# Patient Record
Sex: FEMALE | Race: WHITE | NOT HISPANIC OR LATINO | URBAN - METROPOLITAN AREA
[De-identification: names, ages, dates, MRNs, and addresses within clinical notes are randomized per-mention and may not be internally consistent; named-entity substitution may affect disease eponyms.]

---

## 2018-06-21 ENCOUNTER — APPOINTMENT (RX ONLY)
Dept: URBAN - METROPOLITAN AREA OTHER 9 | Facility: OTHER | Age: 68
Setting detail: DERMATOLOGY
End: 2018-06-21

## 2018-06-21 DIAGNOSIS — D22 MELANOCYTIC NEVI: ICD-10-CM

## 2018-06-21 DIAGNOSIS — L82.1 OTHER SEBORRHEIC KERATOSIS: ICD-10-CM

## 2018-06-21 DIAGNOSIS — L91.8 OTHER HYPERTROPHIC DISORDERS OF THE SKIN: ICD-10-CM

## 2018-06-21 DIAGNOSIS — L81.4 OTHER MELANIN HYPERPIGMENTATION: ICD-10-CM

## 2018-06-21 PROBLEM — D22.5 MELANOCYTIC NEVI OF TRUNK: Status: ACTIVE | Noted: 2018-06-21

## 2018-06-21 PROCEDURE — 99214 OFFICE O/P EST MOD 30 MIN: CPT

## 2018-06-21 PROCEDURE — ? COUNSELING

## 2018-06-21 ASSESSMENT — LOCATION SIMPLE DESCRIPTION DERM
LOCATION SIMPLE: RIGHT FOREHEAD
LOCATION SIMPLE: RIGHT THIGH
LOCATION SIMPLE: LEFT FOREHEAD
LOCATION SIMPLE: UPPER BACK
LOCATION SIMPLE: ABDOMEN
LOCATION SIMPLE: LEFT POSTERIOR UPPER ARM

## 2018-06-21 ASSESSMENT — LOCATION ZONE DERM
LOCATION ZONE: TRUNK
LOCATION ZONE: ARM
LOCATION ZONE: FACE
LOCATION ZONE: LEG

## 2018-06-21 ASSESSMENT — LOCATION DETAILED DESCRIPTION DERM
LOCATION DETAILED: LEFT PROXIMAL POSTERIOR UPPER ARM
LOCATION DETAILED: RIGHT FOREHEAD
LOCATION DETAILED: PERIUMBILICAL SKIN
LOCATION DETAILED: RIGHT ANTERIOR DISTAL THIGH
LOCATION DETAILED: INFERIOR THORACIC SPINE
LOCATION DETAILED: LEFT FOREHEAD

## 2019-05-01 PROBLEM — Z12.11 ENCOUNTER FOR SCREENING FOR MALIGNANT NEOPLASM OF COLON: Status: ACTIVE | Noted: 2019-05-01

## 2019-05-01 PROBLEM — K21.9 GASTRO-ESOPHAGEAL REFLUX DISEASE WITHOUT ESOPHAGITIS: Status: ACTIVE | Noted: 2019-05-01

## 2020-01-09 ENCOUNTER — APPOINTMENT (RX ONLY)
Dept: URBAN - METROPOLITAN AREA CLINIC 349 | Facility: CLINIC | Age: 70
Setting detail: DERMATOLOGY
End: 2020-01-09

## 2020-01-09 DIAGNOSIS — B00.1 HERPESVIRAL VESICULAR DERMATITIS: ICD-10-CM

## 2020-01-09 DIAGNOSIS — M34.83 SYSTEMIC SCLEROSIS WITH POLYNEUROPATHY: ICD-10-CM

## 2020-01-09 DIAGNOSIS — D22 MELANOCYTIC NEVI: ICD-10-CM

## 2020-01-09 DIAGNOSIS — L82.1 OTHER SEBORRHEIC KERATOSIS: ICD-10-CM

## 2020-01-09 DIAGNOSIS — Z71.89 OTHER SPECIFIED COUNSELING: ICD-10-CM

## 2020-01-09 PROBLEM — D22.5 MELANOCYTIC NEVI OF TRUNK: Status: ACTIVE | Noted: 2020-01-09

## 2020-01-09 PROCEDURE — ? OTHER

## 2020-01-09 PROCEDURE — ? BODY PHOTOGRAPHY

## 2020-01-09 PROCEDURE — ? REFERRAL

## 2020-01-09 PROCEDURE — 99203 OFFICE O/P NEW LOW 30 MIN: CPT

## 2020-01-09 PROCEDURE — ? COUNSELING

## 2020-01-09 PROCEDURE — ? EDUCATIONAL RESOURCES PROVIDED

## 2020-01-09 ASSESSMENT — LOCATION SIMPLE DESCRIPTION DERM
LOCATION SIMPLE: RIGHT FOREARM
LOCATION SIMPLE: UPPER BACK
LOCATION SIMPLE: RIGHT LIP
LOCATION SIMPLE: LEFT UPPER BACK
LOCATION SIMPLE: ABDOMEN
LOCATION SIMPLE: RIGHT UPPER BACK
LOCATION SIMPLE: LEFT SHOULDER
LOCATION SIMPLE: RIGHT BACK

## 2020-01-09 ASSESSMENT — LOCATION DETAILED DESCRIPTION DERM
LOCATION DETAILED: RIGHT PROXIMAL DORSAL FOREARM
LOCATION DETAILED: LEFT RIB CAGE
LOCATION DETAILED: RIGHT UPPER CUTANEOUS LIP
LOCATION DETAILED: LEFT INFERIOR MEDIAL UPPER BACK
LOCATION DETAILED: RIGHT SUPERIOR LATERAL UPPER BACK
LOCATION DETAILED: INFERIOR THORACIC SPINE
LOCATION DETAILED: LEFT ANTERIOR SHOULDER
LOCATION DETAILED: LEFT MEDIAL UPPER BACK
LOCATION DETAILED: RIGHT MEDIAL UPPER BACK

## 2020-01-09 ASSESSMENT — LOCATION ZONE DERM
LOCATION ZONE: TRUNK
LOCATION ZONE: ARM
LOCATION ZONE: LIP

## 2020-01-09 NOTE — PROCEDURE: BODY PHOTOGRAPHY
Whole Body Statement: The whole body was photographed today.
Detail Level: Generalized
Reason For Photography: The patient is obtaining body photography to observe existing suspicious moles and or monitor for the appearance of any new lesions.
Number Of Photographs (Optional- Will Not Render If 0): 2
Was The Entire Body Photographed (Cannot Bill Unless Entire Body Photographed)?: No
Consent: Written consent obtained, risks reviewed for whole body photography. Patient understands that photograph costs may not be covered by insurance, and patient is ultimately responsible for payment.

## 2020-01-09 NOTE — PROCEDURE: OTHER
Detail Level: Detailed
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 She had experimental treatment at the time.\\nRemnants of this remain on the patients skin today.
Note Text (......Xxx Chief Complaint.): This diagnosis correlates with the
Other (Free Text): Patient is using otc carmex. Advised patient otc treatment is not typically very effective. Patient states this has been present for 3 days. Offered prescription, patient declined

## 2020-10-22 ENCOUNTER — APPOINTMENT (RX ONLY)
Dept: URBAN - METROPOLITAN AREA CLINIC 349 | Facility: CLINIC | Age: 70
Setting detail: DERMATOLOGY
End: 2020-10-22

## 2020-10-22 DIAGNOSIS — D22 MELANOCYTIC NEVI: ICD-10-CM | Status: STABLE

## 2020-10-22 DIAGNOSIS — L82.0 INFLAMED SEBORRHEIC KERATOSIS: ICD-10-CM

## 2020-10-22 PROBLEM — D22.4 MELANOCYTIC NEVI OF SCALP AND NECK: Status: ACTIVE | Noted: 2020-10-22

## 2020-10-22 PROBLEM — D22.5 MELANOCYTIC NEVI OF TRUNK: Status: ACTIVE | Noted: 2020-10-22

## 2020-10-22 PROCEDURE — ? COUNSELING

## 2020-10-22 PROCEDURE — ? BODY PHOTOGRAPHY

## 2020-10-22 PROCEDURE — ? MEDICAL PHOTOGRAPHY REVIEW

## 2020-10-22 PROCEDURE — ? PHOTO-DOCUMENTATION

## 2020-10-22 PROCEDURE — ? LIQUID NITROGEN

## 2020-10-22 PROCEDURE — 17110 DESTRUCTION B9 LES UP TO 14: CPT

## 2020-10-22 PROCEDURE — 99214 OFFICE O/P EST MOD 30 MIN: CPT | Mod: 25

## 2020-10-22 PROCEDURE — ? OBSERVATION

## 2020-10-22 ASSESSMENT — LOCATION SIMPLE DESCRIPTION DERM
LOCATION SIMPLE: RIGHT UPPER BACK
LOCATION SIMPLE: LEFT UPPER BACK
LOCATION SIMPLE: NECK
LOCATION SIMPLE: RIGHT FOREHEAD

## 2020-10-22 ASSESSMENT — LOCATION DETAILED DESCRIPTION DERM
LOCATION DETAILED: LEFT INFERIOR MEDIAL UPPER BACK
LOCATION DETAILED: LEFT MEDIAL UPPER BACK
LOCATION DETAILED: RIGHT INFERIOR FOREHEAD
LOCATION DETAILED: RIGHT CENTRAL LATERAL NECK
LOCATION DETAILED: RIGHT MEDIAL UPPER BACK

## 2020-10-22 ASSESSMENT — LOCATION ZONE DERM
LOCATION ZONE: FACE
LOCATION ZONE: TRUNK
LOCATION ZONE: NECK

## 2020-10-22 NOTE — PROCEDURE: LIQUID NITROGEN
Add 52 Modifier (Optional): no
Include Z78.9 (Other Specified Conditions Influencing Health Status) As An Associated Diagnosis?: Yes
Consent: The patient's consent was obtained including but not limited to risks of crusting, scabbing, blistering, scarring, darker or lighter pigmentary change, recurrence, incomplete removal and infection.
Medical Necessity Clause: This procedure was medically necessary because the lesions that were treated were:
Number Of Freeze-Thaw Cycles: 2 freeze-thaw cycles
Post-Care Instructions: I reviewed with the patient in detail post-care instructions. Patient is to wear sunprotection, and avoid picking at any of the treated lesions. Pt may apply Vaseline to crusted or scabbing areas.
Medical Necessity Information: It is in your best interest to select a reason for this procedure from the list below. All of these items fulfill various CMS LCD requirements except the new and changing color options.
Duration Of Freeze Thaw-Cycle (Seconds): 5-10
Detail Level: Detailed

## 2020-10-22 NOTE — PROCEDURE: BODY PHOTOGRAPHY
Detail Level: Generalized
Number Of Photographs (Optional- Will Not Render If 0): 1
Was The Entire Body Photographed (Cannot Bill Unless Entire Body Photographed)?: No
Consent: Written consent obtained, risks reviewed for whole body photography. Patient understands that photograph costs may not be covered by insurance, and patient is ultimately responsible for payment.
Whole Body Statement: The whole body was photographed today.
Reason For Photography: The patient is obtaining body photography to observe existing suspicious moles and or monitor for the appearance of any new lesions.

## 2021-05-13 ENCOUNTER — APPOINTMENT (RX ONLY)
Dept: URBAN - METROPOLITAN AREA CLINIC 349 | Facility: CLINIC | Age: 71
Setting detail: DERMATOLOGY
End: 2021-05-13

## 2021-05-13 DIAGNOSIS — D22 MELANOCYTIC NEVI: ICD-10-CM | Status: STABLE

## 2021-05-13 DIAGNOSIS — L57.8 OTHER SKIN CHANGES DUE TO CHRONIC EXPOSURE TO NONIONIZING RADIATION: ICD-10-CM

## 2021-05-13 DIAGNOSIS — Z71.89 OTHER SPECIFIED COUNSELING: ICD-10-CM

## 2021-05-13 DIAGNOSIS — L82.1 OTHER SEBORRHEIC KERATOSIS: ICD-10-CM

## 2021-05-13 PROBLEM — D22.4 MELANOCYTIC NEVI OF SCALP AND NECK: Status: ACTIVE | Noted: 2021-05-13

## 2021-05-13 PROBLEM — D22.5 MELANOCYTIC NEVI OF TRUNK: Status: ACTIVE | Noted: 2021-05-13

## 2021-05-13 PROCEDURE — ? OTHER

## 2021-05-13 PROCEDURE — ? COUNSELING

## 2021-05-13 PROCEDURE — ? SUNSCREEN RECOMMENDATIONS

## 2021-05-13 PROCEDURE — ? MEDICAL PHOTOGRAPHY REVIEW

## 2021-05-13 PROCEDURE — ? EDUCATIONAL RESOURCES PROVIDED

## 2021-05-13 PROCEDURE — 99213 OFFICE O/P EST LOW 20 MIN: CPT

## 2021-05-13 ASSESSMENT — LOCATION SIMPLE DESCRIPTION DERM
LOCATION SIMPLE: RIGHT UPPER BACK
LOCATION SIMPLE: LEFT UPPER BACK
LOCATION SIMPLE: LEFT SHOULDER
LOCATION SIMPLE: NECK

## 2021-05-13 ASSESSMENT — LOCATION ZONE DERM
LOCATION ZONE: NECK
LOCATION ZONE: ARM
LOCATION ZONE: TRUNK

## 2021-05-13 ASSESSMENT — LOCATION DETAILED DESCRIPTION DERM
LOCATION DETAILED: RIGHT INFERIOR UPPER BACK
LOCATION DETAILED: RIGHT MEDIAL UPPER BACK
LOCATION DETAILED: RIGHT CENTRAL LATERAL NECK
LOCATION DETAILED: LEFT MEDIAL UPPER BACK
LOCATION DETAILED: LEFT ANTERIOR SHOULDER

## 2021-05-13 NOTE — PROCEDURE: OTHER
Other (Free Text): offered LN2, patient declined. \\nPatient is not bothered by these
Render Risk Assessment In Note?: yes
Detail Level: Detailed
Note Text (......Xxx Chief Complaint.): This diagnosis correlates with the

## 2021-05-13 NOTE — PROCEDURE: REASSURANCE
Include Location In Plan?: Yes
Hide Include Location In Plan Question?: No
Detail Level: Detailed
Additional Note: Compared to photo this lesion has remained stable

## 2021-07-21 PROCEDURE — 99285 EMERGENCY DEPT VISIT HI MDM: CPT

## 2021-07-21 PROCEDURE — 96375 TX/PRO/DX INJ NEW DRUG ADDON: CPT

## 2021-07-21 PROCEDURE — 96361 HYDRATE IV INFUSION ADD-ON: CPT

## 2021-07-21 PROCEDURE — 96365 THER/PROPH/DIAG IV INF INIT: CPT

## 2021-07-21 PROCEDURE — 81003 URINALYSIS AUTO W/O SCOPE: CPT

## 2021-07-21 PROCEDURE — 96367 TX/PROPH/DG ADDL SEQ IV INF: CPT

## 2021-07-21 PROCEDURE — 96366 THER/PROPH/DIAG IV INF ADDON: CPT

## 2021-07-21 RX ORDER — SODIUM CHLORIDE 0.9 % (FLUSH) 0.9 %
5-10 SYRINGE (ML) INJECTION EVERY 8 HOURS
Status: DISCONTINUED | OUTPATIENT
Start: 2021-07-21 | End: 2021-07-22 | Stop reason: HOSPADM

## 2021-07-21 RX ORDER — SODIUM CHLORIDE 0.9 % (FLUSH) 0.9 %
5-10 SYRINGE (ML) INJECTION AS NEEDED
Status: DISCONTINUED | OUTPATIENT
Start: 2021-07-21 | End: 2021-07-22 | Stop reason: HOSPADM

## 2021-07-22 ENCOUNTER — HOSPITAL ENCOUNTER (EMERGENCY)
Age: 71
Discharge: ANOTHER HOSPITAL | End: 2021-07-22
Payer: MEDICARE

## 2021-07-22 ENCOUNTER — HOSPITAL ENCOUNTER (OUTPATIENT)
Age: 71
Setting detail: OBSERVATION
LOS: 1 days | Discharge: HOME | End: 2021-07-23
Attending: INTERNAL MEDICINE | Admitting: FAMILY MEDICINE
Payer: MEDICARE

## 2021-07-22 ENCOUNTER — APPOINTMENT (OUTPATIENT)
Dept: NON INVASIVE DIAGNOSTICS | Age: 71
End: 2021-07-22
Attending: FAMILY MEDICINE
Payer: MEDICARE

## 2021-07-22 VITALS
WEIGHT: 150 LBS | DIASTOLIC BLOOD PRESSURE: 71 MMHG | RESPIRATION RATE: 13 BRPM | HEART RATE: 76 BPM | OXYGEN SATURATION: 97 % | SYSTOLIC BLOOD PRESSURE: 129 MMHG | TEMPERATURE: 98.5 F

## 2021-07-22 DIAGNOSIS — I48.91 ATRIAL FIBRILLATION WITH RVR (HCC): ICD-10-CM

## 2021-07-22 DIAGNOSIS — E78.2 MIXED HYPERLIPIDEMIA: ICD-10-CM

## 2021-07-22 DIAGNOSIS — I48.91 NEW ONSET ATRIAL FIBRILLATION (HCC): Primary | ICD-10-CM

## 2021-07-22 DIAGNOSIS — I10 ESSENTIAL HYPERTENSION: ICD-10-CM

## 2021-07-22 PROBLEM — R11.2 NAUSEA & VOMITING: Status: ACTIVE | Noted: 2021-07-22

## 2021-07-22 PROBLEM — E11.9 DM TYPE 2 (DIABETES MELLITUS, TYPE 2) (HCC): Status: ACTIVE | Noted: 2021-07-22

## 2021-07-22 PROBLEM — E78.5 HYPERLIPIDEMIA: Status: ACTIVE | Noted: 2021-07-22

## 2021-07-22 PROBLEM — K21.9 GERD (GASTROESOPHAGEAL REFLUX DISEASE): Status: ACTIVE | Noted: 2021-07-22

## 2021-07-22 PROBLEM — N39.0 UTI (URINARY TRACT INFECTION): Status: ACTIVE | Noted: 2021-07-22

## 2021-07-22 LAB
ALBUMIN SERPL-MCNC: 3.7 G/DL (ref 3.2–4.6)
ALBUMIN/GLOB SERPL: 0.9 {RATIO} (ref 1.2–3.5)
ALP SERPL-CCNC: 95 U/L (ref 50–136)
ALT SERPL-CCNC: 26 U/L (ref 12–65)
ANION GAP SERPL CALC-SCNC: 8 MMOL/L (ref 7–16)
AST SERPL-CCNC: 18 U/L (ref 15–37)
ATRIAL RATE: 141 BPM
BACTERIA URNS QL MICRO: 0 /HPF
BASOPHILS # BLD: 0.1 K/UL (ref 0–0.2)
BASOPHILS NFR BLD: 0 % (ref 0–2)
BILIRUB SERPL-MCNC: 0.4 MG/DL (ref 0.2–1.1)
BUN SERPL-MCNC: 15 MG/DL (ref 8–23)
CALCIUM SERPL-MCNC: 9.3 MG/DL (ref 8.3–10.4)
CALCULATED R AXIS, ECG10: 16 DEGREES
CALCULATED T AXIS, ECG11: 16 DEGREES
CASTS URNS QL MICRO: 0 /LPF
CHLORIDE SERPL-SCNC: 107 MMOL/L (ref 98–107)
CO2 SERPL-SCNC: 25 MMOL/L (ref 21–32)
CREAT SERPL-MCNC: 0.77 MG/DL (ref 0.6–1)
DIAGNOSIS, 93000: NORMAL
DIFFERENTIAL METHOD BLD: ABNORMAL
ECHO AO ASC DIAM: 3.6 CM
ECHO AO ROOT DIAM: 2.9 CM
ECHO AV AREA PEAK VELOCITY: 1.82 CM2
ECHO AV AREA VTI: 2 CM2
ECHO AV AREA/BSA PEAK VELOCITY: 1.1 CM2/M2
ECHO AV AREA/BSA VTI: 1.2 CM2/M2
ECHO AV MEAN GRADIENT: 4 MMHG
ECHO AV PEAK GRADIENT: 7.7 MMHG
ECHO AV PEAK VELOCITY: 139 CM/S
ECHO AV VTI: 32.2 CM
ECHO LA AREA 2C: 17.9 CM2
ECHO LA AREA 4C: 17.9 CM2
ECHO LA DIAMETER: 3.4 CM
ECHO LA MAJOR AXIS: 5.47 CM
ECHO LA MINOR AXIS: 3.16 CM
ECHO LA TO AORTIC ROOT RATIO: 1.17
ECHO LA VOL BP: 51.5 ML (ref 22–52)
ECHO LA VOL/BSA BIPLANE: 29.77 ML/M2
ECHO LV E' LATERAL VELOCITY: 6.96 CM/S
ECHO LV E' SEPTAL VELOCITY: 7.35 CM/S
ECHO LV E' SEPTAL VELOCITY: 7.35 CM/S
ECHO LV INTERNAL DIMENSION DIASTOLIC: 3.8 CM (ref 3.9–5.3)
ECHO LV INTERNAL DIMENSION SYSTOLIC: 2.4 CM
ECHO LV IVSD: 1 CM (ref 0.6–0.9)
ECHO LV MASS 2D: 117.3 G (ref 67–162)
ECHO LV MASS INDEX 2D: 67.8 G/M2 (ref 43–95)
ECHO LV POSTERIOR WALL DIASTOLIC: 1 CM (ref 0.6–0.9)
ECHO LVOT DIAM: 1.8 CM
ECHO LVOT PEAK GRADIENT: 4 MMHG
ECHO LVOT PEAK VELOCITY: 99.8 CM/S
ECHO LVOT VTI: 24.8 CM
ECHO MV A VELOCITY: 136 CM/S
ECHO MV AREA PHT: 3 CM2
ECHO MV E VELOCITY: 93 CM/S
ECHO MV E/A RATIO: 0.68
ECHO MV E/E' LATERAL: 13.36
ECHO MV MEAN GRADIENT: 3.5 MMHG
ECHO MV PEAK GRADIENT: 3.5 MMHG
ECHO MV PRESSURE HALF TIME (PHT): 74 MS
ECHO RV INTERNAL DIMENSION: 3.3 CM
ECHO RV TAPSE: 1.76 CM (ref 1.5–2)
EOSINOPHIL # BLD: 0.1 K/UL (ref 0–0.8)
EOSINOPHIL NFR BLD: 0 % (ref 0.5–7.8)
EPI CELLS #/AREA URNS HPF: NORMAL /HPF
ERYTHROCYTE [DISTWIDTH] IN BLOOD BY AUTOMATED COUNT: 12.2 % (ref 11.9–14.6)
EST. AVERAGE GLUCOSE BLD GHB EST-MCNC: 148 MG/DL
GLOBULIN SER CALC-MCNC: 4 G/DL (ref 2.3–3.5)
GLUCOSE BLD STRIP.AUTO-MCNC: 109 MG/DL (ref 65–100)
GLUCOSE BLD STRIP.AUTO-MCNC: 155 MG/DL (ref 65–100)
GLUCOSE BLD STRIP.AUTO-MCNC: 226 MG/DL (ref 65–100)
GLUCOSE SERPL-MCNC: 165 MG/DL (ref 65–100)
HBA1C MFR BLD: 6.8 % (ref 4.2–6.3)
HCT VFR BLD AUTO: 41.5 % (ref 35.8–46.3)
HGB BLD-MCNC: 14.3 G/DL (ref 11.7–15.4)
IMM GRANULOCYTES # BLD AUTO: 0 K/UL (ref 0–0.5)
IMM GRANULOCYTES NFR BLD AUTO: 0 % (ref 0–5)
LIPASE SERPL-CCNC: 168 U/L (ref 73–393)
LYMPHOCYTES # BLD: 2.7 K/UL (ref 0.5–4.6)
LYMPHOCYTES NFR BLD: 23 % (ref 13–44)
MAGNESIUM SERPL-MCNC: 1.9 MG/DL (ref 1.8–2.4)
MCH RBC QN AUTO: 31 PG (ref 26.1–32.9)
MCHC RBC AUTO-ENTMCNC: 34.5 G/DL (ref 31.4–35)
MCV RBC AUTO: 89.8 FL (ref 79.6–97.8)
MONOCYTES # BLD: 0.7 K/UL (ref 0.1–1.3)
MONOCYTES NFR BLD: 6 % (ref 4–12)
NEUTS SEG # BLD: 7.8 K/UL (ref 1.7–8.2)
NEUTS SEG NFR BLD: 69 % (ref 43–78)
NRBC # BLD: 0 K/UL (ref 0–0.2)
PLATELET # BLD AUTO: 312 K/UL (ref 150–450)
PMV BLD AUTO: 10.8 FL (ref 9.4–12.3)
POTASSIUM SERPL-SCNC: 3.7 MMOL/L (ref 3.5–5.1)
PROT SERPL-MCNC: 7.7 G/DL (ref 6.3–8.2)
Q-T INTERVAL, ECG07: 322 MS
QRS DURATION, ECG06: 72 MS
QTC CALCULATION (BEZET), ECG08: 473 MS
RBC # BLD AUTO: 4.62 M/UL (ref 4.05–5.2)
RBC #/AREA URNS HPF: NORMAL /HPF
SERVICE CMNT-IMP: ABNORMAL
SODIUM SERPL-SCNC: 140 MMOL/L (ref 136–145)
TROPONIN-HIGH SENSITIVITY: 31.6 PG/ML (ref 0–14)
TROPONIN-HIGH SENSITIVITY: 52.6 PG/ML (ref 0–14)
TSH SERPL DL<=0.005 MIU/L-ACNC: 1.93 UIU/ML (ref 0.36–3.74)
VENTRICULAR RATE, ECG03: 130 BPM
WBC # BLD AUTO: 11.3 K/UL (ref 4.3–11.1)
WBC URNS QL MICRO: NORMAL /HPF

## 2021-07-22 PROCEDURE — 96367 TX/PROPH/DG ADDL SEQ IV INF: CPT

## 2021-07-22 PROCEDURE — 96366 THER/PROPH/DIAG IV INF ADDON: CPT

## 2021-07-22 PROCEDURE — 96375 TX/PRO/DX INJ NEW DRUG ADDON: CPT

## 2021-07-22 PROCEDURE — 74011250637 HC RX REV CODE- 250/637: Performed by: FAMILY MEDICINE

## 2021-07-22 PROCEDURE — 82962 GLUCOSE BLOOD TEST: CPT

## 2021-07-22 PROCEDURE — 74011250636 HC RX REV CODE- 250/636

## 2021-07-22 PROCEDURE — 83690 ASSAY OF LIPASE: CPT

## 2021-07-22 PROCEDURE — 84443 ASSAY THYROID STIM HORMONE: CPT

## 2021-07-22 PROCEDURE — 85025 COMPLETE CBC W/AUTO DIFF WBC: CPT

## 2021-07-22 PROCEDURE — 87086 URINE CULTURE/COLONY COUNT: CPT

## 2021-07-22 PROCEDURE — 83735 ASSAY OF MAGNESIUM: CPT

## 2021-07-22 PROCEDURE — 87040 BLOOD CULTURE FOR BACTERIA: CPT

## 2021-07-22 PROCEDURE — 81015 MICROSCOPIC EXAM OF URINE: CPT

## 2021-07-22 PROCEDURE — 36415 COLL VENOUS BLD VENIPUNCTURE: CPT

## 2021-07-22 PROCEDURE — 80053 COMPREHEN METABOLIC PANEL: CPT

## 2021-07-22 PROCEDURE — C8929 TTE W OR WO FOL WCON,DOPPLER: HCPCS

## 2021-07-22 PROCEDURE — 74011000258 HC RX REV CODE- 258

## 2021-07-22 PROCEDURE — 93005 ELECTROCARDIOGRAM TRACING: CPT

## 2021-07-22 PROCEDURE — 74011000250 HC RX REV CODE- 250: Performed by: FAMILY MEDICINE

## 2021-07-22 PROCEDURE — 84484 ASSAY OF TROPONIN QUANT: CPT

## 2021-07-22 PROCEDURE — 99218 HC RM OBSERVATION: CPT

## 2021-07-22 PROCEDURE — 96372 THER/PROPH/DIAG INJ SC/IM: CPT

## 2021-07-22 PROCEDURE — 74011000250 HC RX REV CODE- 250

## 2021-07-22 PROCEDURE — 74011250636 HC RX REV CODE- 250/636: Performed by: FAMILY MEDICINE

## 2021-07-22 PROCEDURE — 83036 HEMOGLOBIN GLYCOSYLATED A1C: CPT

## 2021-07-22 PROCEDURE — 96365 THER/PROPH/DIAG IV INF INIT: CPT

## 2021-07-22 PROCEDURE — 74011636637 HC RX REV CODE- 636/637: Performed by: FAMILY MEDICINE

## 2021-07-22 PROCEDURE — 99220 PR INITIAL OBSERVATION CARE/DAY 70 MINUTES: CPT | Performed by: INTERNAL MEDICINE

## 2021-07-22 RX ORDER — ACETAMINOPHEN 325 MG/1
650 TABLET ORAL
Status: DISCONTINUED | OUTPATIENT
Start: 2021-07-22 | End: 2021-07-23 | Stop reason: HOSPADM

## 2021-07-22 RX ORDER — GLIMEPIRIDE 4 MG/1
4 TABLET ORAL
COMMUNITY

## 2021-07-22 RX ORDER — ONDANSETRON 2 MG/ML
4 INJECTION INTRAMUSCULAR; INTRAVENOUS
Status: DISCONTINUED | OUTPATIENT
Start: 2021-07-22 | End: 2021-07-23 | Stop reason: HOSPADM

## 2021-07-22 RX ORDER — GUAIFENESIN 100 MG/5ML
81 LIQUID (ML) ORAL DAILY
COMMUNITY
End: 2021-07-23

## 2021-07-22 RX ORDER — SODIUM CHLORIDE 0.9 % (FLUSH) 0.9 %
5-40 SYRINGE (ML) INJECTION AS NEEDED
Status: DISCONTINUED | OUTPATIENT
Start: 2021-07-22 | End: 2021-07-23 | Stop reason: HOSPADM

## 2021-07-22 RX ORDER — ENOXAPARIN SODIUM 100 MG/ML
40 INJECTION SUBCUTANEOUS DAILY
Status: DISCONTINUED | OUTPATIENT
Start: 2021-07-22 | End: 2021-07-22

## 2021-07-22 RX ORDER — DEXTROSE 50 % IN WATER (D50W) INTRAVENOUS SYRINGE
25-50 AS NEEDED
Status: DISCONTINUED | OUTPATIENT
Start: 2021-07-22 | End: 2021-07-23 | Stop reason: HOSPADM

## 2021-07-22 RX ORDER — ACETAMINOPHEN 650 MG/1
650 SUPPOSITORY RECTAL
Status: DISCONTINUED | OUTPATIENT
Start: 2021-07-22 | End: 2021-07-23 | Stop reason: HOSPADM

## 2021-07-22 RX ORDER — SODIUM CHLORIDE 0.9 % (FLUSH) 0.9 %
5-40 SYRINGE (ML) INJECTION EVERY 8 HOURS
Status: DISCONTINUED | OUTPATIENT
Start: 2021-07-22 | End: 2021-07-23 | Stop reason: HOSPADM

## 2021-07-22 RX ORDER — ATENOLOL 25 MG/1
25 TABLET ORAL DAILY
COMMUNITY
End: 2021-07-23

## 2021-07-22 RX ORDER — METFORMIN HYDROCHLORIDE 500 MG/1
500 TABLET, FILM COATED, EXTENDED RELEASE ORAL
COMMUNITY

## 2021-07-22 RX ORDER — LOSARTAN POTASSIUM 25 MG/1
25 TABLET ORAL DAILY
Status: DISCONTINUED | OUTPATIENT
Start: 2021-07-22 | End: 2021-07-23 | Stop reason: HOSPADM

## 2021-07-22 RX ORDER — DILTIAZEM HYDROCHLORIDE 120 MG/1
120 CAPSULE, COATED, EXTENDED RELEASE ORAL DAILY
Status: DISCONTINUED | OUTPATIENT
Start: 2021-07-22 | End: 2021-07-23 | Stop reason: HOSPADM

## 2021-07-22 RX ORDER — INSULIN LISPRO 100 [IU]/ML
INJECTION, SOLUTION INTRAVENOUS; SUBCUTANEOUS
Status: DISCONTINUED | OUTPATIENT
Start: 2021-07-22 | End: 2021-07-23 | Stop reason: HOSPADM

## 2021-07-22 RX ORDER — DILTIAZEM HYDROCHLORIDE 5 MG/ML
10 INJECTION INTRAVENOUS ONCE
Status: COMPLETED | OUTPATIENT
Start: 2021-07-22 | End: 2021-07-22

## 2021-07-22 RX ORDER — DEXTROSE 40 %
15 GEL (GRAM) ORAL AS NEEDED
Status: DISCONTINUED | OUTPATIENT
Start: 2021-07-22 | End: 2021-07-23 | Stop reason: HOSPADM

## 2021-07-22 RX ORDER — ASPIRIN 81 MG/1
81 TABLET ORAL DAILY
Status: DISCONTINUED | OUTPATIENT
Start: 2021-07-22 | End: 2021-07-23

## 2021-07-22 RX ORDER — ONDANSETRON 2 MG/ML
4 INJECTION INTRAMUSCULAR; INTRAVENOUS
Status: COMPLETED | OUTPATIENT
Start: 2021-07-22 | End: 2021-07-22

## 2021-07-22 RX ORDER — LOSARTAN POTASSIUM AND HYDROCHLOROTHIAZIDE 12.5; 1 MG/1; MG/1
1 TABLET ORAL DAILY
COMMUNITY
End: 2021-07-23

## 2021-07-22 RX ORDER — ONDANSETRON 8 MG/1
4 TABLET, ORALLY DISINTEGRATING ORAL
Status: DISCONTINUED | OUTPATIENT
Start: 2021-07-22 | End: 2021-07-23 | Stop reason: HOSPADM

## 2021-07-22 RX ORDER — POLYETHYLENE GLYCOL 3350 17 G/17G
17 POWDER, FOR SOLUTION ORAL DAILY PRN
Status: DISCONTINUED | OUTPATIENT
Start: 2021-07-22 | End: 2021-07-23 | Stop reason: HOSPADM

## 2021-07-22 RX ORDER — ATORVASTATIN CALCIUM 40 MG/1
40 TABLET, FILM COATED ORAL DAILY
COMMUNITY

## 2021-07-22 RX ADMIN — DILTIAZEM HYDROCHLORIDE 120 MG: 120 CAPSULE, COATED, EXTENDED RELEASE ORAL at 09:32

## 2021-07-22 RX ADMIN — INSULIN LISPRO 4 UNITS: 100 INJECTION, SOLUTION INTRAVENOUS; SUBCUTANEOUS at 11:09

## 2021-07-22 RX ADMIN — Medication 10 ML: at 09:00

## 2021-07-22 RX ADMIN — DILTIAZEM HYDROCHLORIDE 10 MG: 5 INJECTION INTRAVENOUS at 04:33

## 2021-07-22 RX ADMIN — PERFLUTREN 1 ML: 6.52 INJECTION, SUSPENSION INTRAVENOUS at 11:45

## 2021-07-22 RX ADMIN — ASPIRIN 81 MG: 81 TABLET ORAL at 09:31

## 2021-07-22 RX ADMIN — SODIUM CHLORIDE 2.5 MG/HR: 900 INJECTION, SOLUTION INTRAVENOUS at 04:33

## 2021-07-22 RX ADMIN — INSULIN LISPRO 2 UNITS: 100 INJECTION, SOLUTION INTRAVENOUS; SUBCUTANEOUS at 22:26

## 2021-07-22 RX ADMIN — ONDANSETRON 4 MG: 2 INJECTION INTRAMUSCULAR; INTRAVENOUS at 00:59

## 2021-07-22 RX ADMIN — Medication 10 ML: at 14:21

## 2021-07-22 RX ADMIN — Medication 10 ML: at 22:27

## 2021-07-22 RX ADMIN — SODIUM CHLORIDE 500 ML: 900 INJECTION, SOLUTION INTRAVENOUS at 00:58

## 2021-07-22 RX ADMIN — LOSARTAN POTASSIUM 25 MG: 25 TABLET, FILM COATED ORAL at 11:08

## 2021-07-22 RX ADMIN — ENOXAPARIN SODIUM 40 MG: 40 INJECTION SUBCUTANEOUS at 09:32

## 2021-07-22 RX ADMIN — CEFTRIAXONE 1 G: 1 INJECTION, POWDER, FOR SOLUTION INTRAMUSCULAR; INTRAVENOUS at 02:29

## 2021-07-22 NOTE — CONSULTS
[facility] Cardiology Initial Cardiac Evaluation                Date of  Admission: 7/22/2021  7:50 AM     PCP: Johana Stern MD  Requested by: Dr Elfego Walters  Primary Cardiologist:  None  [facility] Attending: Dr Dada Cuevas    Reason for Evaluation: A Fib RVR      Roni Brito is a 66 y.o. female with only known medical history is HTN, strong family history of MI with paternal grandmother and father, strong family history of CVA TIA with paternal grandmother and DM. The patient came into the hospital with complaints of a panic attack after multiple episodes of N/V with N that would not go away. The patient was found to be in A Fib with RVR in the ED and was given IV Cardizem and placed on a Cardizem Drip. She denied CP or any other medical complaints at the time. The patient was started on IV ABX for leucocytosis by the primary team.  She has no known history of A Fib in the past and an echo has been ordered. The patient converted back to NSR prior to transfer and was on 2.5 mg/min Cardizem drip which has been transitioned to 120 mg CD. The patient is also currently on Lovenox daily for DVT prevention. Currently patient has no complaints and states she is in good overall health. Recent Cardiac Synopsis      No past medical history on file. No past surgical history on file. Allergies   Allergen Reactions    Codeine Rash    Invokana [Canagliflozin] Rash    Januvia [Sitagliptin] Rash    Sulfa (Sulfonamide Antibiotics) Rash      No family history on file.    Social History     Socioeconomic History    Marital status:      Spouse name: Not on file    Number of children: Not on file    Years of education: Not on file    Highest education level: Not on file   Occupational History    Not on file   Tobacco Use    Smoking status: Not on file   Substance and Sexual Activity    Alcohol use: Not on file    Drug use: Not on file    Sexual activity: Not on file   Other Topics Concern    Not on file Social History Narrative    Not on file     Social Determinants of Health     Financial Resource Strain:     Difficulty of Paying Living Expenses:    Food Insecurity:     Worried About Running Out of Food in the Last Year:     [address] in the Last Year:    Transportation Needs:     Lack of Transportation (Medical):  Lack of Transportation (Non-Medical):    Physical Activity:     Days of Exercise per Week:     Minutes of Exercise per Session:    Stress:     Feeling of Stress :    Social Connections:     Frequency of Communication with Friends and Family:     Frequency of Social Gatherings with Friends and Family:     Attends Restoration Services:     Active Member of Clubs or Organizations:     Attends Club or Organization Meetings:     Marital Status:    Intimate Partner Violence:     Fear of Current or Ex-Partner:     Emotionally Abused:     Physically Abused:     Sexually Abused:        Prior to Admission Medications   Prescriptions Last Dose Informant Patient Reported? Taking?   aspirin 81 mg chewable tablet 7/22/2021 at Unknown time  Yes Yes   Sig: Take 81 mg by mouth daily. atenoloL (TENORMIN) 25 mg tablet 7/21/2021 at Unknown time  Yes Yes   Sig: Take 25 mg by mouth daily. atorvastatin (LIPITOR) 40 mg tablet 7/21/2021 at Unknown time  Yes Yes   Sig: Take 40 mg by mouth daily. glimepiride (AMARYL) 4 mg tablet 7/21/2021 at Unknown time  Yes Yes   Sig: Take 4 mg by mouth every morning. losartan-hydroCHLOROthiazide (HYZAAR) 100-12.5 mg per tablet 7/21/2021 at Unknown time  Yes Yes   Sig: Take 1 Tablet by mouth daily. metFORMIN (GLUMETZA ER) 500 mg TG24 24 hour tablet 7/21/2021 at Unknown time  Yes Yes   Sig: Take 500 mg by mouth.       Facility-Administered Medications: None       Current Facility-Administered Medications   Medication Dose Route Frequency    dextrose 40% (GLUTOSE) oral gel 1 Tube  15 g Oral PRN    glucagon (GLUCAGEN) injection 1 mg  1 mg IntraMUSCular PRN  dextrose (D50W) injection syrg 12.5-25 g  25-50 mL IntraVENous PRN    insulin lispro (HUMALOG) injection   SubCUTAneous AC&HS    ondansetron (ZOFRAN) injection 4 mg  4 mg IntraVENous Q6H PRN    sodium chloride (NS) flush 5-40 mL  5-40 mL IntraVENous Q8H    sodium chloride (NS) flush 5-40 mL  5-40 mL IntraVENous PRN    acetaminophen (TYLENOL) tablet 650 mg  650 mg Oral Q6H PRN    Or    acetaminophen (TYLENOL) suppository 650 mg  650 mg Rectal Q6H PRN    polyethylene glycol (MIRALAX) packet 17 g  17 g Oral DAILY PRN    ondansetron (ZOFRAN ODT) tablet 4 mg  4 mg Oral Q8H PRN    Or    ondansetron (ZOFRAN) injection 4 mg  4 mg IntraVENous Q6H PRN    enoxaparin (LOVENOX) injection 40 mg  40 mg SubCUTAneous DAILY    dilTIAZem ER (CARDIZEM CD) capsule 120 mg  120 mg Oral DAILY    losartan (COZAAR) tablet 25 mg  25 mg Oral DAILY    aspirin delayed-release tablet 81 mg  81 mg Oral DAILY    [START ON 7/23/2021] cefTRIAXone (ROCEPHIN) 1 g in 0.9% sodium chloride (MBP/ADV) 50 mL MBP  1 g IntraVENous Q24H       Review of Systems   Constitutional: Negative for weight gain and weight loss. HENT: Negative. Eyes: Negative. Cardiovascular: Negative for chest pain (currently pain free), claudication, cyanosis, dyspnea on exertion, irregular heartbeat, leg swelling, near-syncope, orthopnea, palpitations, paroxysmal nocturnal dyspnea and syncope. Respiratory: Negative for cough, shortness of breath and wheezing. Endocrine: Negative. Skin: Negative. Musculoskeletal: Negative. Gastrointestinal: Positive for nausea and vomiting. Genitourinary: Negative. Neurological: Negative for dizziness. Psychiatric/Behavioral: Negative. Allergic/Immunologic: Negative.          Physical Exam  Vitals:    07/22/21 0750   BP: (!) 156/79   Pulse: 72   Resp: 16   Temp: 97.7 °F (36.5 °C)   SpO2: 96%   Weight: 70 kg (154 lb 6.4 oz)   Height: 5' 4\" (1.626 m)       Last 3 Recorded Weights in this Encounter 07/22/21 0750   Weight: 70 kg (154 lb 6.4 oz)       No intake or output data in the 24 hours ending 07/22/21 1225    Net IO Since Admission: No IO data has been entered for this period [07/22/21 1225]      Physical Exam:  General: Well Developed, Obese, No Acute Distress  HEENT: pupils equal and round, no abnormalities noted  Neck: supple, no JVD, no carotid bruits  Heart: S1S2 with RRR without murmurs or gallops  Lungs: Clear throughout auscultation bilaterally without adventitious sounds  Abd: soft, nontender, nondistended, with good bowel sounds  Ext: warm, no edema, calves supple/nontender, pulses 2+ bilaterally  Skin: warm and dry  Psychiatric: Normal mood and affect  Neurologic: Alert and oriented X 3      Recent Results (from the past 24 hour(s))   CBC WITH AUTOMATED DIFF    Collection Time: 07/22/21 12:12 AM   Result Value Ref Range    WBC 11.3 (H) 4.3 - 11.1 K/uL    RBC 4.62 4.05 - 5.2 M/uL    HGB 14.3 11.7 - 15.4 g/dL    HCT 41.5 35.8 - 46.3 %    MCV 89.8 79.6 - 97.8 FL    MCH 31.0 26.1 - 32.9 PG    MCHC 34.5 31.4 - 35.0 g/dL    RDW 12.2 11.9 - 14.6 %    PLATELET 657 863 - 246 K/uL    MPV 10.8 9.4 - 12.3 FL    ABSOLUTE NRBC 0.00 0.0 - 0.2 K/uL    DF AUTOMATED      NEUTROPHILS 69 43 - 78 %    LYMPHOCYTES 23 13 - 44 %    MONOCYTES 6 4.0 - 12.0 %    EOSINOPHILS 0 (L) 0.5 - 7.8 %    BASOPHILS 0 0.0 - 2.0 %    IMMATURE GRANULOCYTES 0 0.0 - 5.0 %    ABS. NEUTROPHILS 7.8 1.7 - 8.2 K/UL    ABS. LYMPHOCYTES 2.7 0.5 - 4.6 K/UL    ABS. MONOCYTES 0.7 0.1 - 1.3 K/UL    ABS. EOSINOPHILS 0.1 0.0 - 0.8 K/UL    ABS. BASOPHILS 0.1 0.0 - 0.2 K/UL    ABS. IMM.  GRANS. 0.0 0.0 - 0.5 K/UL   METABOLIC PANEL, COMPREHENSIVE    Collection Time: 07/22/21 12:12 AM   Result Value Ref Range    Sodium 140 136 - 145 mmol/L    Potassium 3.7 3.5 - 5.1 mmol/L    Chloride 107 98 - 107 mmol/L    CO2 25 21 - 32 mmol/L    Anion gap 8 7 - 16 mmol/L    Glucose 165 (H) 65 - 100 mg/dL    BUN 15 8 - 23 MG/DL    Creatinine 0.77 0.6 - 1.0 MG/DL    GFR est AA >60 >60 ml/min/1.73m2    GFR est non-AA >60 >60 ml/min/1.73m2    Calcium 9.3 8.3 - 10.4 MG/DL    Bilirubin, total 0.4 0.2 - 1.1 MG/DL    ALT (SGPT) 26 12 - 65 U/L    AST (SGOT) 18 15 - 37 U/L    Alk. phosphatase 95 50 - 136 U/L    Protein, total 7.7 6.3 - 8.2 g/dL    Albumin 3.7 3.2 - 4.6 g/dL    Globulin 4.0 (H) 2.3 - 3.5 g/dL    A-G Ratio 0.9 (L) 1.2 - 3.5     MAGNESIUM    Collection Time: 07/22/21 12:12 AM   Result Value Ref Range    Magnesium 1.9 1.8 - 2.4 mg/dL   LIPASE    Collection Time: 07/22/21 12:12 AM   Result Value Ref Range    Lipase 168 73 - 393 U/L   TROPONIN-HIGH SENSITIVITY    Collection Time: 07/22/21 12:12 AM   Result Value Ref Range    Troponin-High Sensitivity 31.6 (H) 0 - 14 pg/mL   EKG, 12 LEAD, INITIAL    Collection Time: 07/22/21 12:29 AM   Result Value Ref Range    Ventricular Rate 130 BPM    Atrial Rate 141 BPM    QRS Duration 72 ms    Q-T Interval 322 ms    QTC Calculation (Bezet) 473 ms    Calculated R Axis 16 degrees    Calculated T Axis 16 degrees    Diagnosis       Atrial fibrillation with rapid ventricular response  Nonspecific ST abnormality , probably digitalis effect  Abnormal ECG  No previous ECGs available  Confirmed by Mayo Mahmood MD (), MARCEL SPRAGUE ([id number]) on 7/22/2021 7:30:21 AM     URINE MICROSCOPIC    Collection Time: 07/22/21 12:30 AM   Result Value Ref Range    WBC 20-50 0 /hpf    RBC 0-3 0 /hpf    Epithelial cells 0-3 0 /hpf    Bacteria 0 0 /hpf    Casts 0 0 /lpf   TROPONIN-HIGH SENSITIVITY    Collection Time: 07/22/21  2:14 AM   Result Value Ref Range    Troponin-High Sensitivity 52.6 (H) 0 - 14 pg/mL   TSH 3RD GENERATION    Collection Time: 07/22/21  9:00 AM   Result Value Ref Range    TSH 1.930 0.358 - 3.740 uIU/mL   GLUCOSE, POC    Collection Time: 07/22/21 10:47 AM   Result Value Ref Range    Glucose (POC) 226 (H) 65 - 100 mg/dL    Performed by Jarred         No results found.     Echo Results  (Last 48 hours)    None            Initial Recommendations: Cardiac Problems:    Atrial fibrillation with RVR: This new problem with no known history of A. fib. Patient is converted back to normal sinus rhythm is on Cardizem 120 mg once a day. Will stop Lovenox injections and start Eliquis 5 mg twice daily in the morning. Monitor blood pressure with addition of new medications. Medications from home have been reduced already. echo is currently pending, TSH normal, and the patient will need follow up in our office. Further recommendations pending clinical course and attending recommendations. Demand ischemia: In the setting of A. fib RVR minimally elevated troponins. No gross EKG changes with no report of chest pain. Patient has strong family history of coronary disease on her father side. Will get lipid panel in the morning to restratify and check hemoglobin A1c. Nausea and vomiting: Per primary team no further nausea now back in normal rhythm. Leukocytosis: Per primary team on IV antibiotics. UA clear. Thank you very much for requesting a Cardiology Evaluation. We appreciate the opportunity to participate in this patient's care. We will follow along with above stated plan. This is initial management plan but please see attendings consult note for full plan of care.     Yue Vaz NP AGACNP-BC

## 2021-07-22 NOTE — H&P
[facility] History and Physical       Name:  Mary Son  Age:71 y.o. Sex:female   :  1950    MRN:  [id number]   PCP:  Jenni Hoover MD      Admit Date:  2021  7:50 AM   Chief Complaint: Nausea vomiting, A. fib with RVR    Reason for Admission:   Atrial fibrillation with RVR ([facility] [I48.91]  Nausea & vomiting [R11.2]  HTN (hypertension) [I10]  Hyperlipidemia [E78.5]  DM type 2 (diabetes mellitus, type 2) ([facility] [E11.9]    Assessment & Plan:   Nausea vomiting? Etiology-history of GERD-continue Protonix. No nausea vomiting since admission. Will need an outpatient GI follow-up. New A. fib with RVR-presently in sinus rhythm, rate controlled. Will DC Cardizem drip. Start patient on Cardizem 120mg long-acting daily. Mildly elevated troponin-no acute EKG changes, probably demand ischemia. Cardiology following. Echo ordered. Probable UTI-urinalysis shows 20-50 WBCs. Urine culture ordered. Started on Rocephin. Hypertension-patient normally on losartan/hydrochlorthiazide 100/12.5 mg daily and also on atenolol 50 mg daily. We will start on losartan 25 mg daily. Hold patient Tylenol and hydrochlorothiazide. Diabetes mellitus type 2-patient normally on Metformin, glimepiride and Trulicity. Takes Trulicity every Saturday. We will hold patient metformin, glimepiride. Add sliding scale insulin. Hyperlipidemia-continue patient Lipitor  Depression-continue patient home medication        Disposition/Expected LOS: 1 to 2 days. Diet: DIET ADULT  DIET NPO  VTE ppx: Patient was started on Eliquis by cardiology later on today  GI ppx: Protonix  Code status: Full Code        History of Presenting Illness:     Mary Son is a 66 y.o. female with medical history of hypertension, hyperlipidemia, diabetes mellitus type 2 and depression. Patient presented to the emergency room at [address] with a chief complaint of nausea vomiting.   Says these episodes happen intermittently once or twice a month and sometimes none for next 2 or 3 months. No relation with diet. Sometimes wake up with nausea has a few episodes of vomiting until afternoon and then she is completely normal.    The present episode started in the evening of Tuesday. Had nausea few episodes of vomiting. Was doing okay on Wednesday morning and afternoon. Able to tolerate soup. Says had some diet Wednesday evening, started having persistent nausea went several episodes of vomiting, says bilious. Patient says she also felt little bit anxious thinking that the symptoms are not improving, hence presented to [address] emergency room for further evaluation. Incidentally patient was found to be in new A. fib with RVR, was placed on Cardizem drip, transferred to [facility] for [facility] management. Patient states that she did not have any more nausea vomiting since presented to the emergency room. Did not complain of palpitation or chest pain or shortness of breath. No acid reflex or epigastric pain or abdominal pain. EKG A. fib with RVR with a heart rate of 130. Urinalysis shows WBC 22-50. Patient says she does not have any frequency or burning micturition. Admitted for A. fib with RVR, probable UTI and nausea vomiting ? etiology      Review of Systems:  A 14 point review of systems was taken and pertinent positive as per HPI.        past medical history - hypertension, hyperlipidemia, diabetes mellitus type 2 and depression. past surgical history - Nil. Family History : reviewed  Family history of coronary disease father side. Social History     Tobacco Use    Smoking status: Nil   Substance Use Topics    Alcohol use: Nil       Allergies   Allergen Reactions    Codeine Rash    Invokana [Canagliflozin] Rash    Januvia [Sitagliptin] Rash    Sulfa (Sulfonamide Antibiotics) Rash         There is no immunization history on file for this patient.       PTA Medications:  Patient home medication reviewed    Objective:     Patient Vitals for the past 24 hrs:   Temp Pulse Resp BP SpO2   07/22/21 1337    (!) 156/79    07/22/21 1222 97.9 °F (36.6 °C) 74 16 139/65 100 %   07/22/21 0750 97.7 °F (36.5 °C) 72 16 (!) 156/79 96 %       Oxygen Therapy  O2 Sat (%): 100 % (07/22/21 1222)  O2 Device: None (Room air) (07/22/21 1247)    Body mass index is 25.75 kg/m². Physical Exam:    General:  No acute distress, speaking in full sentences. On Cardizem drip. HEENT:  Pupils equal and reactive to light and accommodation, oropharynx is clear   Neck:   Supple, no lymphadenopathy, no JVD   Lungs:  Clear to auscultation bilaterally   CV:   Regular rate and rhythm with normal S1 and S2   Abdomen:  Soft, nontender, nondistended, normoactive bowel sounds   Extremities:  No cyanosis clubbing or edema   Neuro:  Nonfocal, A&O x3   Psych:  Normal mood and affect       Data Reviewed: I have reviewed all labs, meds, and studies. Recent Results (from the past 24 hour(s))   CBC WITH AUTOMATED DIFF    Collection Time: 07/22/21 12:12 AM   Result Value Ref Range    WBC 11.3 (H) 4.3 - 11.1 K/uL    RBC 4.62 4.05 - 5.2 M/uL    HGB 14.3 11.7 - 15.4 g/dL    HCT 41.5 35.8 - 46.3 %    MCV 89.8 79.6 - 97.8 FL    MCH 31.0 26.1 - 32.9 PG    MCHC 34.5 31.4 - 35.0 g/dL    RDW 12.2 11.9 - 14.6 %    PLATELET 864 785 - 409 K/uL    MPV 10.8 9.4 - 12.3 FL    ABSOLUTE NRBC 0.00 0.0 - 0.2 K/uL    DF AUTOMATED      NEUTROPHILS 69 43 - 78 %    LYMPHOCYTES 23 13 - 44 %    MONOCYTES 6 4.0 - 12.0 %    EOSINOPHILS 0 (L) 0.5 - 7.8 %    BASOPHILS 0 0.0 - 2.0 %    IMMATURE GRANULOCYTES 0 0.0 - 5.0 %    ABS. NEUTROPHILS 7.8 1.7 - 8.2 K/UL    ABS. LYMPHOCYTES 2.7 0.5 - 4.6 K/UL    ABS. MONOCYTES 0.7 0.1 - 1.3 K/UL    ABS. EOSINOPHILS 0.1 0.0 - 0.8 K/UL    ABS. BASOPHILS 0.1 0.0 - 0.2 K/UL    ABS. IMM.  GRANS. 0.0 0.0 - 0.5 K/UL   METABOLIC PANEL, COMPREHENSIVE    Collection Time: 07/22/21 12:12 AM   Result Value Ref Range    Sodium 140 136 - 145 mmol/L Potassium 3.7 3.5 - 5.1 mmol/L    Chloride 107 98 - 107 mmol/L    CO2 25 21 - 32 mmol/L    Anion gap 8 7 - 16 mmol/L    Glucose 165 (H) 65 - 100 mg/dL    BUN 15 8 - 23 MG/DL    Creatinine 0.77 0.6 - 1.0 MG/DL    GFR est AA >60 >60 ml/min/1.73m2    GFR est non-AA >60 >60 ml/min/1.73m2    Calcium 9.3 8.3 - 10.4 MG/DL    Bilirubin, total 0.4 0.2 - 1.1 MG/DL    ALT (SGPT) 26 12 - 65 U/L    AST (SGOT) 18 15 - 37 U/L    Alk.  phosphatase 95 50 - 136 U/L    Protein, total 7.7 6.3 - 8.2 g/dL    Albumin 3.7 3.2 - 4.6 g/dL    Globulin 4.0 (H) 2.3 - 3.5 g/dL    A-G Ratio 0.9 (L) 1.2 - 3.5     MAGNESIUM    Collection Time: 07/22/21 12:12 AM   Result Value Ref Range    Magnesium 1.9 1.8 - 2.4 mg/dL   LIPASE    Collection Time: 07/22/21 12:12 AM   Result Value Ref Range    Lipase 168 73 - 393 U/L   TROPONIN-HIGH SENSITIVITY    Collection Time: 07/22/21 12:12 AM   Result Value Ref Range    Troponin-High Sensitivity 31.6 (H) 0 - 14 pg/mL   EKG, 12 LEAD, INITIAL    Collection Time: 07/22/21 12:29 AM   Result Value Ref Range    Ventricular Rate 130 BPM    Atrial Rate 141 BPM    QRS Duration 72 ms    Q-T Interval 322 ms    QTC Calculation (Bezet) 473 ms    Calculated R Axis 16 degrees    Calculated T Axis 16 degrees    Diagnosis       Atrial fibrillation with rapid ventricular response  Nonspecific ST abnormality , probably digitalis effect  Abnormal ECG  No previous ECGs available  Confirmed by Yola Aguilar MD (), MARCEL SPRAGUE ([id number]) on 7/22/2021 7:30:21 AM     URINE MICROSCOPIC    Collection Time: 07/22/21 12:30 AM   Result Value Ref Range    WBC 20-50 0 /hpf    RBC 0-3 0 /hpf    Epithelial cells 0-3 0 /hpf    Bacteria 0 0 /hpf    Casts 0 0 /lpf   TROPONIN-HIGH SENSITIVITY    Collection Time: 07/22/21  2:14 AM   Result Value Ref Range    Troponin-High Sensitivity 52.6 (H) 0 - 14 pg/mL   TSH 3RD GENERATION    Collection Time: 07/22/21  9:00 AM   Result Value Ref Range    TSH 1.930 0.358 - 3.740 uIU/mL   HEMOGLOBIN A1C WITH EAG    Collection Time: 07/22/21  9:00 AM   Result Value Ref Range    Hemoglobin A1c 6.8 (H) 4.2 - 6.3 %    Est. average glucose 148 mg/dL   GLUCOSE, POC    Collection Time: 07/22/21 10:47 AM   Result Value Ref Range    Glucose (POC) 226 (H) 65 - 100 mg/dL    [facility]formed by [facility]    ECHO ADULT COMPLETE    Collection Time: 07/22/21 11:50 AM   Result Value Ref Range    Left Atrium Major Axis 5.47 cm    LA Area 2C 17.90 cm2    Aortic Valve Area by Continuity of Peak Velocity 1.82 cm2    LV E' Septal Velocity 7.35 cm/s    AO ASC D 3.60 cm    Ao Root D 2.90 cm    LVOT d 1.80 cm    LVOT Peak Velocity 99.80 cm/s    LVOT Peak Gradient 4.0 mmHg    LVOT VTI 24.80 cm    Aortic Valve Systolic Peak Velocity 971.33 cm/s    AoV PG 7.7 mmHg    Aortic Valve Systolic Mean Gradient 4.0 mmHg    AoV VTI 32.20 cm    Aortic Valve Area by Continuity of VTI 2.0 cm2    Left Atrium Minor Axis 3.16 cm    LA Volume 51.5 22.0 - 52.0 mL    LA Area 4C 17.9 cm2    LA Balta-Posterior Dimension 3.40 cm    Left Atrium to Aortic Root Ratio 1.17     LV E' Septal Velocity 7.35 cm/s    LV E' Lateral Velocity 6.96 cm/s    MV E Alfredo 93.00 cm/s    MV A Alfredo 136.00 cm/s    LVIDs 2.40 cm    LVIDd 3.80 (A) 3.90 - 5.30 cm    IVSd 1.00 (A) 0.60 - 0.90 cm    LVPWd 1.00 (A) 0.60 - 0.90 cm    LV Mass .3 67.0 - 162.0 g    LV Mass AL Index 67.8 43.0 - 95.0 g/m2    MV Mean Gradient 3.5 mmHg    MV Peak Gradient 3.5 mmHg    Mitral Valve Pressure Half-time 74.0 ms    MVA (PHT) 3.0 cm2    Tapse 1.76 1.50 - 2.00 cm    RVIDd 3.30 cm    MV E/A 0.68     E/E' lateral 13.36     LA Vol Index 29.77 ml/m2    HEATH/BSA Pk Alfredo 1.1 cm2/m2    HEATH/BSA VTI 1.2 cm2/m2       EKG Results     None          All Micro Results     Procedure Component Value Units Date/Time    CULTURE, URINE [[id number]] Collected: 07/22/21 1250    Order Status: Completed Specimen: Urine from Clean catch Updated: 07/22/21 1257          Other Studies:  No results found.       Medications:  Medications Administered Problem List:     Hospital Problems as of 7/22/2021 Never Reviewed        Codes Class Noted - Resolved POA    Hyperlipidemia ICD-10-CM: E78.5  ICD-9-CM: 272.4  7/22/2021 - Present Unknown        HTN (hypertension) ICD-10-CM: I10  ICD-9-CM: 401.9  7/22/2021 - Present Unknown        DM type 2 (diabetes mellitus, type 2) ([facility] ICD-10-CM: E11.9  ICD-9-CM: 250.00  7/22/2021 - Present Unknown        Nausea & vomiting ICD-10-CM: R11.2  ICD-9-CM: 787.01  7/22/2021 - Present Unknown        Atrial fibrillation with RVR (HCC) ICD-10-CM: I48.91  ICD-9-CM: 427.31  7/22/2021 - Present Unknown        GERD (gastroesophageal reflux disease) ICD-10-CM: K21.9  ICD-9-CM: 530.81  7/22/2021 - Present Unknown        UTI (urinary tract infection) ICD-10-CM: N39.0  ICD-9-CM: 599.0  7/22/2021 - Present Unknown                 Signed By: Antionette Guaman MD   [employer] [facility]    July 22, 2021

## 2021-07-22 NOTE — ED NOTES
TRANSFER - OUT REPORT:    Verbal report given to [facility], AN AFFILIATE OF [facility] SYSTEM, RN (name) on Jeannine Perez  being transferred to [address] [place] [address] (unit) for routine progression of care       Report consisted of patients Situation, Background, Assessment and   Recommendations(SBAR). Information from the following report(s) SBAR was reviewed with the receiving nurse. Lines:   Peripheral IV 07/22/21 Right Antecubital (Active)   Site Assessment Clean, dry, & intact 07/22/21 0016   Phlebitis Assessment 0 07/22/21 0016   Infiltration Assessment 0 07/22/21 0016   Dressing Status Clean, dry, & intact 07/22/21 0016   Dressing Type Tape;Transparent 07/22/21 0016   Hub Color/Line Status Pink;Flushed;Patent 07/22/21 0016   Action Taken Blood drawn 07/22/21 0016        Opportunity for questions and clarification was provided.       Patient transported with:   [address] [facility]

## 2021-07-22 NOTE — ED PROVIDER NOTES
39-year-old female complaining of nausea vomiting anxiousness. The patient has experienced this several times in the past usually in the morning. This morning got worse to the point where she vomited multiple times and had a panic attack. Patient arrives to the ER with irregular rhythm that appears to be A. fib. She has no history of A. fib. She denies chest pain. Vomiting   This is a recurrent problem. The problem has been gradually worsening. The emesis has an appearance of stomach contents and bilious material. There has been no fever. Pertinent negatives include no chills, no fever, no sweats, no abdominal pain, no diarrhea and no arthralgias. The patient is not pregnant. Her pertinent negatives include no inflammatory bowel disease, no recent abdominal surgery and no DM. No past medical history on file. No past surgical history on file. No family history on file. Social History     Socioeconomic History    Marital status:      Spouse name: Not on file    Number of children: Not on file    Years of education: Not on file    Highest education level: Not on file   Occupational History    Not on file   Tobacco Use    Smoking status: Not on file   Substance and Sexual Activity    Alcohol use: Not on file    Drug use: Not on file    Sexual activity: Not on file   Other Topics Concern    Not on file   Social History Narrative    Not on file     Social Determinants of Health     Financial Resource Strain:     Difficulty of Paying Living Expenses:    Food Insecurity:     Worried About Running Out of Food in the Last Year:     [address] in the Last Year:    Transportation Needs:     Lack of Transportation (Medical):      Lack of Transportation (Non-Medical):    Physical Activity:     Days of Exercise per Week:     Minutes of Exercise per Session:    Stress:     Feeling of Stress :    Social Connections:     Frequency of Communication with Friends and Family:     Frequency of Social Gatherings with Friends and Family:     Attends [employer] Services:     Active Member of Clubs or Organizations:     Attends Club or Organization Meetings:     Marital Status:    Intimate Partner Violence:     Fear of Current or Ex-Partner:     Emotionally Abused:     Physically Abused:     Sexually Abused: ALLERGIES: Codeine, Invokana [canagliflozin], Januvia [sitagliptin], and Sulfa (sulfonamide antibiotics)    Review of Systems   Constitutional: Negative. Negative for activity change, chills and fever. HENT: Negative. Eyes: Negative. Respiratory: Negative. Cardiovascular: Negative. Gastrointestinal: Positive for vomiting. Negative for abdominal pain and diarrhea. Genitourinary: Negative. Musculoskeletal: Negative. Negative for arthralgias. Skin: Negative. Neurological: Negative. Psychiatric/Behavioral: Negative. All other systems reviewed and are negative. Vitals:    07/22/21 0001 07/22/21 0028 07/22/21 0037   BP: (!) 138/90 (!) 146/76    Pulse: (!) 131 (!) 156 (!) 167   Resp: 18 18 19   Temp: 98.5 °F (36.9 °C)     SpO2: 96%  98%   Weight: 68 kg (150 lb)              Physical Exam  Vitals and nursing note reviewed. Constitutional:       General: She is not in acute distress. Appearance: She is well-developed. HENT:      Head: Normocephalic and atraumatic. Right Ear: External ear normal.      Left Ear: External ear normal.      Nose: Nose normal.   Eyes:      General: No scleral icterus. Right eye: No discharge. Left eye: No discharge. Conjunctiva/sclera: Conjunctivae normal.      Pupils: Pupils are equal, round, and reactive to light. Cardiovascular:      Rate and Rhythm: Rhythm irregularly irregular. Pulmonary:      Effort: Pulmonary effort is normal. No respiratory distress. Breath sounds: Normal breath sounds. No stridor. No wheezing or rales.    Abdominal:      General: Bowel sounds are normal. There is no distension. Palpations: Abdomen is soft. Tenderness: There is no abdominal tenderness. Musculoskeletal:         General: Normal range of motion. Cervical back: Normal range of motion. Skin:     General: Skin is warm and dry. Findings: No rash. Neurological:      Mental Status: She is alert and oriented to person, place, and time. Motor: No abnormal muscle tone. Coordination: Coordination normal.   Psychiatric:         Behavior: Behavior normal.          MDM  Number of Diagnoses or Management Options  Atrial fibrillation with RVR ([address]  New onset atrial fibrillation (HCC)  Diagnosis management comments: Patient appears to be in atrial fibrillation she is not aware of ever having this before. Patient's heart rate remained alert regular after fluids. She was given Cardizem and had slowing of her rate but no conversion to normal sinus rhythm. Patient's vital signs remained stable her heart rate stayed below 100. Troponin was slightly elevated although this is most likely due to demand from the rapid heart rate of 1 40-1 60.        Amount and/or Complexity of Data Reviewed  Clinical lab tests: ordered and reviewed  Tests in the radiology section of CPT®: ordered and reviewed  Tests in the medicine section of CPT®: ordered and reviewed  Decide to obtain previous medical records or to obtain history from someone other than the patient: yes  Review and summarize past medical records: yes  Independent visualization of images, tracings, or specimens: yes    Risk of Complications, Morbidity, and/or Mortality  Presenting problems: high  Diagnostic procedures: high  Management options: high    Patient Progress  Patient progress: stable         EKG    Date/Time: 7/22/2021 1:23 AM  Performed by: Silverio Vázquez MD  Authorized by: Silverio Vázquez MD     ECG reviewed by ED Physician in the absence of a cardiologist: yes    Previous ECG:     Previous ECG: Unavailable  Interpretation:     Interpretation: abnormal    Rate:     ECG rate:  140    ECG rate assessment: tachycardic    Rhythm:     Rhythm: atrial fibrillation    Ectopy:     Ectopy: none    Conduction:     Conduction: normal

## 2021-07-22 NOTE — PROGRESS NOTES
Pt admitted with hypertension; afib with RVR. Cardiology is consulted and will see pt in their office after discharge as pt converted to NSR without intervention. Chart screened by  for discharge planning. No needs identified at this time. Please consult  if any new issues arise. Care Management Interventions  PCP Verified by CM:  Yes Arely Carmen)  Last Visit to PCP: 05/26/21  Transition of Care Consult (CM Consult): Discharge Planning  Discharge Durable Medical Equipment: No  Occupational Therapy Consult: No  Discharge Location  Discharge Placement: Home

## 2021-07-22 NOTE — ROUTINE PROCESS
Verbal bedside report received from Kin, [facility]. Assumed care of patient.

## 2021-07-22 NOTE — PROGRESS NOTES
TRANSFER - IN REPORT:    Verbal report received from Jordan on Harish Laser being received from [place] (ER) for routine progression of care. Report consisted of patients Situation, Background, Assessment and Recommendations(SBAR). Information from the following report(s) ED Summary was reviewed. Opportunity for questions and clarification was provided. Assessment completed upon patients arrival to unit and care assumed. Patient received to room 325. Patient connected to monitor and assessment completed. Plan of care reviewed. Patient oriented to room and call light. Patient aware to use call light to communicate any chest pain or needs. Admission skin assessment completed with second RN and reveals the following: Patient has scattered scars. Sacrum and heels are free from any breakdown.

## 2021-07-22 NOTE — ROUTINE PROCESS
Bedside and Verbal shift change report given to aniyah (oncoming nurse) by self (offgoing nurse). Report included the following information SBAR, Kardex, Intake/Output and MAR.

## 2021-07-23 VITALS
HEIGHT: 64 IN | BODY MASS INDEX: 26.36 KG/M2 | OXYGEN SATURATION: 98 % | HEART RATE: 65 BPM | RESPIRATION RATE: 17 BRPM | DIASTOLIC BLOOD PRESSURE: 71 MMHG | SYSTOLIC BLOOD PRESSURE: 133 MMHG | WEIGHT: 154.4 LBS | TEMPERATURE: 97.1 F

## 2021-07-23 PROBLEM — R11.2 NAUSEA & VOMITING: Status: RESOLVED | Noted: 2021-07-22 | Resolved: 2021-07-23

## 2021-07-23 PROBLEM — N30.90 CYSTITIS: Status: ACTIVE | Noted: 2021-07-22

## 2021-07-23 LAB
ANION GAP SERPL CALC-SCNC: 6 MMOL/L (ref 7–16)
BUN SERPL-MCNC: 13 MG/DL (ref 8–23)
CALCIUM SERPL-MCNC: 8.9 MG/DL (ref 8.3–10.4)
CHLORIDE SERPL-SCNC: 111 MMOL/L (ref 98–107)
CHOLEST SERPL-MCNC: 180 MG/DL
CO2 SERPL-SCNC: 26 MMOL/L (ref 21–32)
CREAT SERPL-MCNC: 0.63 MG/DL (ref 0.6–1)
GLUCOSE BLD STRIP.AUTO-MCNC: 117 MG/DL (ref 65–100)
GLUCOSE BLD STRIP.AUTO-MCNC: 181 MG/DL (ref 65–100)
GLUCOSE SERPL-MCNC: 118 MG/DL (ref 65–100)
HDLC SERPL-MCNC: 41 MG/DL (ref 40–60)
HDLC SERPL: 4.4 {RATIO}
LDLC SERPL CALC-MCNC: 105.2 MG/DL
MAGNESIUM SERPL-MCNC: 2.1 MG/DL (ref 1.8–2.4)
POTASSIUM SERPL-SCNC: 3.4 MMOL/L (ref 3.5–5.1)
SERVICE CMNT-IMP: ABNORMAL
SERVICE CMNT-IMP: ABNORMAL
SODIUM SERPL-SCNC: 143 MMOL/L (ref 136–145)
TRIGL SERPL-MCNC: 169 MG/DL (ref 35–150)
VLDLC SERPL CALC-MCNC: 33.8 MG/DL (ref 6–23)

## 2021-07-23 PROCEDURE — 74011000258 HC RX REV CODE- 258: Performed by: FAMILY MEDICINE

## 2021-07-23 PROCEDURE — 82962 GLUCOSE BLOOD TEST: CPT

## 2021-07-23 PROCEDURE — 80061 LIPID PANEL: CPT

## 2021-07-23 PROCEDURE — 74011250637 HC RX REV CODE- 250/637: Performed by: NURSE PRACTITIONER

## 2021-07-23 PROCEDURE — 74011250636 HC RX REV CODE- 250/636: Performed by: FAMILY MEDICINE

## 2021-07-23 PROCEDURE — 36415 COLL VENOUS BLD VENIPUNCTURE: CPT

## 2021-07-23 PROCEDURE — 74011250637 HC RX REV CODE- 250/637: Performed by: FAMILY MEDICINE

## 2021-07-23 PROCEDURE — 83735 ASSAY OF MAGNESIUM: CPT

## 2021-07-23 PROCEDURE — 99225 PR SBSQ OBSERVATION CARE/DAY 25 MINUTES: CPT | Performed by: INTERNAL MEDICINE

## 2021-07-23 PROCEDURE — 96375 TX/PRO/DX INJ NEW DRUG ADDON: CPT

## 2021-07-23 PROCEDURE — 74011250637 HC RX REV CODE- 250/637: Performed by: INTERNAL MEDICINE

## 2021-07-23 PROCEDURE — 99218 HC RM OBSERVATION: CPT

## 2021-07-23 PROCEDURE — 80048 BASIC METABOLIC PNL TOTAL CA: CPT

## 2021-07-23 RX ORDER — POTASSIUM CHLORIDE 20 MEQ/1
40 TABLET, EXTENDED RELEASE ORAL
Status: COMPLETED | OUTPATIENT
Start: 2021-07-23 | End: 2021-07-23

## 2021-07-23 RX ORDER — DILTIAZEM HYDROCHLORIDE 120 MG/1
120 CAPSULE, COATED, EXTENDED RELEASE ORAL DAILY
Qty: 30 CAPSULE | Refills: 0 | Status: SHIPPED | OUTPATIENT
Start: 2021-07-24 | End: 2021-08-03 | Stop reason: SDUPTHER

## 2021-07-23 RX ORDER — CEPHALEXIN 500 MG/1
500 CAPSULE ORAL 3 TIMES DAILY
Qty: 6 CAPSULE | Refills: 0 | Status: SHIPPED | OUTPATIENT
Start: 2021-07-23 | End: 2021-08-03

## 2021-07-23 RX ORDER — LOSARTAN POTASSIUM 25 MG/1
25 TABLET ORAL DAILY
Qty: 30 TABLET | Refills: 0 | Status: SHIPPED | OUTPATIENT
Start: 2021-07-24 | End: 2021-08-03 | Stop reason: SDUPTHER

## 2021-07-23 RX ORDER — POTASSIUM CHLORIDE 20 MEQ/1
20 TABLET, EXTENDED RELEASE ORAL DAILY
Qty: 3 TABLET | Refills: 0 | Status: SHIPPED | OUTPATIENT
Start: 2021-07-23

## 2021-07-23 RX ADMIN — LOSARTAN POTASSIUM 25 MG: 25 TABLET, FILM COATED ORAL at 09:31

## 2021-07-23 RX ADMIN — APIXABAN 5 MG: 5 TABLET, FILM COATED ORAL at 09:31

## 2021-07-23 RX ADMIN — CEFTRIAXONE 1 G: 1 INJECTION, POWDER, FOR SOLUTION INTRAMUSCULAR; INTRAVENOUS at 02:44

## 2021-07-23 RX ADMIN — DILTIAZEM HYDROCHLORIDE 120 MG: 120 CAPSULE, COATED, EXTENDED RELEASE ORAL at 09:31

## 2021-07-23 RX ADMIN — Medication 10 ML: at 04:53

## 2021-07-23 RX ADMIN — POTASSIUM CHLORIDE 40 MEQ: 20 TABLET, EXTENDED RELEASE ORAL at 10:51

## 2021-07-23 NOTE — PROGRESS NOTES
[facility] CARDIOLOGY PROGRESS NOTE    7/23/2021 10:07 AM    Admit Date: 7/22/2021        Subjective:   Stable overnight without angina, CHF, or palpitations. AF rate controlled. Vitals stable and controlled. No other complaints overnight. Tolerating meds well. Wants to go home,    Objective:      Vitals:    07/22/21 2022 07/23/21 0014 07/23/21 0429 07/23/21 0740   BP: (!) 163/78 (!) 149/72 (!) 146/69 133/71   Pulse: 71 75 73 65   Resp: 18  16 17   Temp: 98 °F (36.7 °C) 97.8 °F (36.6 °C) 97.4 °F (36.3 °C) 97.1 °F (36.2 °C)   SpO2: 100% 98% 97% 98%   Weight:   154 lb 6.4 oz (70 kg)    Height:           Physical Exam:  Neck- supple, no JVD  CV- regular rate and rhythm no MRG  Lung- clear bilaterally, decreased bibasilar  Abd- soft, nontender, nondistended  Ext- no edema  Skin- warm and dry    Data Review:   Recent Labs     07/23/21  0450 07/22/21  0012    140   K 3.4* 3.7   MG 2.1 1.9   BUN 13 15   CREA 0.63 0.77   * 165*   WBC  --  11.3*   HGB  --  14.3   HCT  --  41.5   PLT  --  312   CHOL 180  --    TRIGL 169*  --    HDL 41  --        Assessment and Plan:     Atrial fibrillation with RVR: Resolved, spontaneous conversion back to normal sinus rhythm -continue Cardizem 120 mg once a day. Continue Eliquis 5 mg twice daily -  Monitor blood pressure with addition of new medications. TSH normal, follow-up in our office in 2 weeks. Echo looks good with normal left ventricular regional wall motion and ejection fraction and no severe valvular pathology.     Demand ischemia: In the setting of A. fib with RVR-no angina or CHF and minimally elevated troponins. No gross EKG changes with no report of chest pain. Patient has strong family history of coronary disease on her father side.     Lipids pretty good, needs to maximize diet/exercise/lifestyle changes and get sugar down with hemoglobin A1c 6.8.     Nausea and vomiting: Per primary team no further nausea now back in normal rhythm.     Leukocytosis: Per primary team on IV antibiotics. UA clear.     Naveed Castro MD  [address] Cardiology  Pager [phone]

## 2021-07-23 NOTE — ROUTINE PROCESS
Bedside and Verbal shift change report given to self (oncoming nurse) by Chu Swenson (offgoing nurse). Report included the following information SBAR, Kardex, Intake/Output and MAR.

## 2021-07-23 NOTE — DISCHARGE INSTRUCTIONS
Call 911 or go to the emergency room if you develop any red flag signs we discussed today. Please notify your primary care if any test/investigation is not covered by your insurance before getting it done. Patient Education        Atrial Fibrillation: Care Instructions  Your Care Instructions     Atrial fibrillation is an irregular and often fast heartbeat. Treating this condition is important for several reasons. It can cause blood clots, which can travel from your heart to your brain and cause a stroke. If you have a fast heartbeat, you may feel lightheaded, dizzy, and weak. An irregular heartbeat can also increase your risk for heart failure. Atrial fibrillation is often the result of another heart condition, such as high blood pressure or coronary artery disease. Making changes to improve your heart condition will help you stay healthy and active. Follow-up care is a key part of your treatment and safety. Be sure to make and go to all appointments, and call your doctor if you are having problems. It's also a good idea to know your test results and keep a list of the medicines you take. How can you care for yourself at home? Medicines    · Take your medicines exactly as prescribed. Call your doctor if you think you are having a problem with your medicine. You will get more details on the specific medicines your doctor prescribes.     · If your doctor has given you a blood thinner to prevent a stroke, be sure you get instructions about how to take your medicine safely. Blood thinners can cause serious bleeding problems.     · Do not take any vitamins, over-the-counter drugs, or herbal products without talking to your doctor first.   Lifestyle changes    · Do not smoke. Smoking can increase your chance of a stroke and heart attack. If you need help quitting, talk to your doctor about stop-smoking programs and medicines. These can increase your chances of quitting for good.     · Eat a heart-healthy diet.   · Stay at a healthy weight. Lose weight if you need to.     · Limit alcohol to 2 drinks a day for men and 1 drink a day for women. Too much alcohol can cause health problems.     · Avoid colds and flu. Get a pneumococcal vaccine shot. If you have had one before, ask your doctor whether you need another dose. Get a flu shot every year. If you must be around people with colds or flu, wash your hands often. Activity    · If your doctor recommends it, get more exercise. Walking is a good choice. Bit by bit, increase the amount you walk every day. Try for at least 30 minutes on most days of the week. You also may want to swim, bike, or do other activities. Your doctor may suggest that you join a cardiac rehabilitation program so that you can have help increasing your physical activity safely.     · Start light exercise if your doctor says it is okay. Even a small amount will help you get stronger, have more energy, and manage stress. Walking is an easy way to get exercise. Start out by walking a little more than you did in the hospital. Gradually increase the amount you walk.     · When you exercise, watch for signs that your heart is working too hard. You are pushing too hard if you cannot talk while you are exercising. If you become short of breath or dizzy or have chest pain, sit down and rest immediately.     · Check your pulse regularly. Place two fingers on the artery at the palm side of your wrist, in line with your thumb. If your heartbeat seems uneven or fast, talk to your doctor. When should you call for help? Call 911 anytime you think you may need emergency care. For example, call if:    · You have symptoms of a heart attack. These may include:  ? Chest pain or pressure, or a strange feeling in the chest.  ? Sweating. ? Shortness of breath. ? Nausea or vomiting. ? Pain, pressure, or a strange feeling in the back, neck, jaw, or upper belly or in one or both shoulders or arms.   ? Lightheadedness or sudden weakness. ? A fast or irregular heartbeat. After you call 911, the  may tell you to chew 1 adult-strength or 2 to 4 low-dose aspirin. Wait for an ambulance. Do not try to drive yourself.     · You have symptoms of a stroke. These may include:  ? Sudden numbness, tingling, weakness, or loss of movement in your face, arm, or leg, especially on only one side of your body. ? Sudden vision changes. ? Sudden trouble speaking. ? Sudden confusion or trouble understanding simple statements. ? Sudden problems with walking or balance. ? A sudden, severe headache that is different from past headaches.     · You passed out (lost consciousness). Call your doctor now or seek immediate medical care if:    · You have new or increased shortness of breath.     · You feel dizzy or lightheaded, or you feel like you may faint.     · Your heart rate becomes irregular.     · You can feel your heart flutter in your chest or skip heartbeats. Tell your doctor if these symptoms are new or worse. Watch closely for changes in your health, and be sure to contact your doctor if you have any problems. Where can you learn more? Go to http://www.gray.com/  Enter U020 in the search box to learn more about \"Atrial Fibrillation: Care Instructions. \"  Current as of: August 31, 2020               Content Version: 12.8  © 5622-8510 [employer]. Care instructions adapted under license by [employer] (which disclaims liability or warranty for this information). If you have questions about a medical condition or this instruction, always ask your healthcare professional. [place] [address] any warranty or liability for your use of this information.          Patient Education        Urinary Tract Infection (UTI) in Women: Care Instructions  Overview     A urinary tract infection, or UTI, is a general term for an infection anywhere between the kidneys and the urethra (where urine comes out). Most UTIs are bladder infections. They often cause pain or burning when you urinate. UTIs are caused by bacteria and can be cured with antibiotics. Be sure to complete your treatment so that the infection does not get worse. Follow-up care is a key part of your treatment and safety. Be sure to make and go to all appointments, and call your doctor if you are having problems. It's also a good idea to know your test results and keep a list of the medicines you take. How can you care for yourself at home? · Take your antibiotics as directed. Do not stop taking them just because you feel better. You need to take the full course of antibiotics. · Drink extra water and other fluids for the next day or two. This will help make the urine less concentrated and help wash out the bacteria that are causing the infection. (If you have kidney, heart, or liver disease and have to limit fluids, talk with your doctor before you increase the amount of fluids you drink.)  · Avoid drinks that are carbonated or have caffeine. They can irritate the bladder. · Urinate often. Try to empty your bladder each time. · To relieve pain, take a hot bath or lay a heating pad set on low over your lower belly or genital area. Never go to sleep with a heating pad in place. To prevent UTIs  · Drink plenty of water each day. This helps you urinate often, which clears bacteria from your system. (If you have kidney, heart, or liver disease and have to limit fluids, talk with your doctor before you increase the amount of fluids you drink.)  · Urinate when you need to. · If you are sexually active, urinate right after you have sex. · Change sanitary pads often. · Avoid douches, bubble baths, feminine hygiene sprays, and other feminine hygiene products that have deodorants. · After going to the bathroom, wipe from front to back. When should you call for help?    Call your doctor now or seek immediate medical care if:    · Symptoms such as fever, chills, nausea, or vomiting get worse or appear for the first time.     · You have new pain in your back just below your rib cage. This is called flank pain.     · There is new blood or pus in your urine.     · You have any problems with your antibiotic medicine. Watch closely for changes in your health, and be sure to contact your doctor if:    · You are not getting better after taking an antibiotic for 2 days.     · Your symptoms go away but then come back. Where can you learn more? Go to http://www.gray.com/  Enter O695 in the search box to learn more about \"Urinary Tract Infection (UTI) in Women: Care Instructions. \"  Current as of: June 29, 2020               Content Version: 12.8  © 2006-2021 [employer]. Care instructions adapted under license by [employer] (which disclaims liability or warranty for this information). If you have questions about a medical condition or this instruction, always ask your healthcare professional. [place] [address] any warranty or liability for your use of this information. Patient Education   Apixaban (By mouth)   Apixaban (a-PIX-a-ban)  Treats and prevents blood clots. This medicine is a blood thinner. Brand Name(s): Eliquis   There may be other brand names for this medicine. When This Medicine Should Not Be Used: This medicine is not right for everyone. Do not use it if you had an allergic reaction to apixaban or you have active bleeding. How to Use This Medicine:   Tablet  · Your doctor will tell you how much medicine to use. Do not use more than directed. · If you are not able to swallow the tablets whole, they may be crushed and mixed in water, 5% dextrose in water (D5W), apple juice, or applesauce. The crushed tablets may be mixed with 60 mL of water or D5W dose and given through a nasogastric tube (NGT). · This medicine should come with a Medication Guide.  Ask your pharmacist for a copy if you do not have one. · Missed dose: Take a dose as soon as you remember. If it is almost time for your next dose, wait until then and take a regular dose. Do not take extra medicine to make up for a missed dose. · Store the medicine in a closed container at room temperature, away from heat, moisture, and direct light. Drugs and Foods to Avoid:   Ask your doctor or pharmacist before using any other medicine, including over-the-counter medicines, vitamins, and herbal products. · Some medicines can affect how apixaban works. Tell your doctor if you are using any of the following:   ¨ Carbamazepine, clarithromycin, itraconazole, ketoconazole, phenytoin, rifampin, ritonavir, Kaylan's wort  ¨ Blood thinner (including clopidogrel, heparin, prasugrel, warfarin)  ¨ Medicine to treat depression  ¨ NSAID pain or arthritis medicine (including aspirin, celecoxib, diclofenac, ibuprofen, naproxen)  Warnings While Using This Medicine:   · Tell your doctor if you are pregnant or breastfeeding, or if you have kidney disease, liver disease, bleeding problems, or an artificial heart valve. · Do not stop using this medicine suddenly without asking your doctor. You might have a higher risk of stroke for a short time after you stop using this medicine. · This medicine increases your risk for bleeding that can become serious if not controlled. You may also bruise easily, and it may take longer than usual for bleeding to stop. · This medicine may increase your risk for blood clots in your spine or back if you undergo an epidural or spinal puncture. This could lead to paralysis. Tell your doctor if you ever had spine problems or back surgery. · Tell any doctor or dentist who treats you that you are using this medicine. With your doctor's supervision, you may need to stop using this medicine several days before you have surgery or medical tests.   · Your doctor will do lab tests at regular visits to check on the effects of this medicine. Keep all appointments. · Keep all medicine out of the reach of children. Never share your medicine with anyone. Possible Side Effects While Using This Medicine:   Call your doctor right away if you notice any of these side effects:  · Allergic reaction: Itching or hives, swelling in your face or hands, swelling or tingling in your mouth or throat, chest tightness, trouble breathing  · Change in how much or how often you urinate, red or pink urine  · Chest pain, trouble breathing  · Coughing up blood, vomiting blood or material that looks like coffee grounds  · Numbness, tingling, or muscle weakness in your legs or feet  · Red or black, tarry stools  · Unusual bleeding, bruising, or weakness  If you notice other side effects that you think are caused by this medicine, tell your doctor. Call your doctor for medical advice about side effects. You may report side effects to FDA at 2-183-FDA-8725  © 2017 [facility] Information is for End User's use only and may not be sold, redistributed or otherwise used for commercial purposes. The above information is an  only. It is not intended as medical advice for individual conditions or treatments. Talk to your doctor, nurse or pharmacist before following any medical regimen to see if it is safe and effective for you. Patient Education      Cephalexin (Bio-Cef, Keflex) - (By mouth)   Why this medicine is used:   Treats infections. Contact a nurse or doctor right away if you have:  · Blistering, peeling, or red skin rash  · Severe or bloody diarrhea     Common side effects:  · Mild diarrhea or nausea  © 2017 [address] is for End User's use only and may not be sold, redistributed or otherwise used for commercial purposes. Patient Education   Diltiazem (By mouth)   Diltiazem (gqn-XVB-j-zem)  Treats high blood pressure and angina (chest pain).  This medicine is a calcium channel blocker. Brand Name(s): Cardizem, Cardizem CD, Cardizem LA, Cartia XT, Dilt-XR, Matzim LA, Taztia XT, Tiazac   There may be other brand names for this medicine. When This Medicine Should Not Be Used: This medicine is not right for everyone. Do not use it if you had an allergic reaction to diltiazem or similar medicines. How to Use This Medicine:   Long Acting Capsule, 12 Hour Capsule, 24 Hour Capsule, Tablet, Long Acting Tablet  · Take your medicine as directed. Your dose may need to be changed several times to find what works best for you. · It is best to take this medicine on an empty stomach. · Swallow this medicine whole. Do not crush, break, chew, or open the capsule or tablet. · Missed dose: Take a dose as soon as you remember. If it is almost time for your next dose, wait until then and take a regular dose. Do not take extra medicine to make up for a missed dose. · Store the medicine in a closed container at room temperature, away from heat, moisture, and direct light. Drugs and Foods to Avoid:   Ask your doctor or pharmacist before using any other medicine, including over-the-counter medicines, vitamins, and herbal products. · Some medicines can affect how diltiazem works. Tell your doctor if you are using the following:  ¨ Buspirone, carbamazepine, cimetidine, clonidine, cyclosporine, digoxin, ivabradine, lovastatin, midazolam, quinidine, rifampin, simvastatin, triazolam  ¨ Other blood pressure medicine, including a beta-blocker  . Warnings While Using This Medicine:   · Tell your doctor if you are pregnant or breastfeeding, or if you have kidney disease, liver disease, or digestion problems. Tell your doctor about all heart problems that you have, including heart failure or rhythm problems.   · This medicine may cause the following problems:  ¨ Slow heartbeat  ¨ Worsening of heart failure  ¨ Serious skin reactions  · This medicine could lower your blood pressure too much, especially when you first use it or if you are dehydrated. Stand or sit up slowly if you feel lightheaded or dizzy. Do not drive or do anything else that could be dangerous until you know how this medicine affects you. · Do not stop using this medicine without asking your doctor, even if you feel well. This medicine will not cure high blood pressure, but it will help keep it in normal range. You may have to take blood pressure medicine for the rest of your life. · Your doctor will do lab tests at regular visits to check on the effects of this medicine. Keep all appointments. · Keep all medicine out of the reach of children. Never share your medicine with anyone. Possible Side Effects While Using This Medicine:   Call your doctor right away if you notice any of these side effects:  · Allergic reaction: Itching or hives, swelling in your face or hands, swelling or tingling in your mouth or throat, chest tightness, trouble breathing  · Blistering, peeling, red skin rash  · Dark urine or pale stools, nausea, vomiting, loss of appetite, stomach pain, yellow skin or eyes  · Fast, slow, uneven, or pounding heartbeat  · Rapid weight gain, swelling in your hands, ankles, or feet  If you notice other side effects that you think are caused by this medicine, tell your doctor. Call your doctor for medical advice about side effects. You may report side effects to FDA at 0-997-FDA-0620  © 2017 [facility] Information is for End User's use only and may not be sold, redistributed or otherwise used for commercial purposes. The above information is an  only. It is not intended as medical advice for individual conditions or treatments. Talk to your doctor, nurse or pharmacist before following any medical regimen to see if it is safe and effective for you. Patient Education   Losartan (By mouth)   Losartan (jennifer-CORI-tan)  Treats high blood pressure.  Reduces the risk of stroke in patients with high blood pressure and an enlarged heart. Treats kidney disease in patients with diabetes. This medicine is an angiotensin receptor blocker (ARB). Brand Name(s): Cozaar   There may be other brand names for this medicine. When This Medicine Should Not Be Used: This medicine is not right for everyone. Do not use it if you had an allergic reaction to losartan, or if you are pregnant. Do not use this medicine together with aliskiren if you have diabetes. How to Use This Medicine:   Tablet  · Take your medicine as directed. Your dose may need to be changed several times to find what works best for you. · Drink plenty of fluids if you exercise, sweat more than usual, or have diarrhea or vomiting. · Read and follow the patient instructions that come with this medicine. Talk to your doctor or pharmacist if you have any questions. · Missed dose: Take a dose as soon as you remember. If it is almost time for your next dose, wait until then and take a regular dose. Do not take extra medicine to make up for a missed dose. · Store the medicine in a closed container at room temperature, away from heat, moisture, and direct light. Drugs and Foods to Avoid:   Ask your doctor or pharmacist before using any other medicine, including over-the-counter medicines, vitamins, and herbal products. · Some medicines can affect how losartan works. Tell your doctor if you are using any of the following:   ¨ Lithium  ¨ Rifampin  ¨ A diuretic (water pill), such as spironolactone, triamterene, or amiloride  ¨ NSAID pain or arthritis medicine, such as aspirin, diclofenac, ibuprofen, or naproxen  ¨ Another blood pressure medicine, such as aliskiren, benazepril, enalapril, or lisinopril  · Ask your doctor before you use any medicine, supplement, or salt substitute that contains potassium. Warnings While Using This Medicine:   · It is not safe to take this medicine during pregnancy. It could harm an unborn baby.  Tell your doctor right away if you become pregnant. · Tell your doctor if you are breastfeeding, or if you have kidney disease, liver disease, congestive heart failure, or diabetes. Tell your doctor if you have had angioedema that was caused by a blood pressure medicine. · This medicine could lower your blood pressure too much, especially when you first use it or if you are dehydrated. Stand or sit up slowly if you feel lightheaded or dizzy. · Your doctor will do lab tests at regular visits to check on the effects of this medicine. Keep all appointments. · Keep all medicine out of the reach of children. Never share your medicine with anyone. Possible Side Effects While Using This Medicine:   Call your doctor right away if you notice any of these side effects:  · Allergic reaction: Itching or hives, swelling in your face or hands, swelling or tingling in your mouth or throat, chest tightness, trouble breathing  · Change in how much or how often you urinate  · Confusion, weakness, uneven heartbeat, trouble breathing, numbness or tingling in your hands, feet, or lips  · Lightheadedness, dizziness, fainting  · Rapid weight gain, swelling in your hands, ankles, or feet  If you notice these less serious side effects, talk with your doctor:   · Diarrhea  · Tiredness  If you notice other side effects that you think are caused by this medicine, tell your doctor. Call your doctor for medical advice about side effects. You may report side effects to FDA at 2-956-FDA-2483  © 2017 [address] Information is for End User's use only and may not be sold, redistributed or otherwise used for commercial purposes. The above information is an  only. It is not intended as medical advice for individual conditions or treatments. Talk to your doctor, nurse or pharmacist before following any medical regimen to see if it is safe and effective for you.      Patient Education   Potassium Chloride (By mouth)   Potassium Chloride (iiy-EAJ-cn-um KLOR-dennis)  Prevents and treats low potassium levels in the blood. Brand Name(s): K-Tab, [address] [place], Klor-Con, Klor-Con 10, Klor-Con 8, Klor-Con M10, Klor-Con M15, Klor-Con M20, Klor-Con Sprinkle   There may be other brand names for this medicine. When This Medicine Should Not Be Used: This medicine is not right for everyone. Do not use if you had an allergic reaction to potassium. How to Use This Medicine:   Tablet, Long Acting Capsule, Powder, Liquid, Long Acting Tablet, Granule  · Your doctor will tell you how much medicine to use. Do not use more than directed. · Take this medicine with food or right after eating, to avoid stomach upset. · Powder or oral liquid:  You must mix with at least one-half cup (4 ounces) of water or juice. You could damage your stomach if you take the medicine without mixing it with water or juice. · Tablet or capsule: Do not chew, crush, or break. Swallow it whole with full glass of water. · Extended-release capsule: Swallow whole with a full glass of water. If you cannot swallow the extended-release capsule, you may open it and pour the medicine into a small amount of soft food such as pudding, yogurt, or applesauce. Stir this mixture well and swallow it without chewing. · Extended-release tablet:  Swallow whole with a full glass of water. If you have trouble swallowing, ask your doctor or pharmacist if you may crush the tablet. Some brands of this medicine must be swallowed whole, but other brands may be crushed. · If you mix the medicine in water or soft food, do not mix until you are ready to take your dose. Do not save mixed medicine for later use. · Carefully follow your doctor's instructions about any special diet. · Missed dose: Take a dose as soon as you remember. If it is almost time for your next dose, wait until then and take a regular dose. Do not take extra medicine to make up for a missed dose.   · Store the medicine in a closed container at room temperature, away from heat, moisture, and direct light. Drugs and Foods to Avoid:   Ask your doctor or pharmacist before using any other medicine, including over-the-counter medicines, vitamins, and herbal products. · Some foods and medicines can affect how potassium chloride works. Tell you doctor if you are using any of the following:  ¨ Potassium-sparing diuretic (water pill)  ¨ ACE inhibitor blood pressure medicine  ¨ Salt substitute  ¨ Digoxin  Warnings While Using This Medicine:   · Tell your doctor if you are pregnant or breastfeeding or if you have kidney disease, heart disease, or problems with your digestive system. · This medicine may cause the following problems:  ¨ Bleeding or ulcers in the digestive system  ¨ Potassium levels that are too high  · Your doctor will do lab tests at regular visits to check on the effects of this medicine. Keep all appointments. · Keep all medicine out of the reach of children. Never share your medicine with anyone. Possible Side Effects While Using This Medicine:   Call your doctor right away if you notice any of these side effects:  · Allergic reaction: Itching or hives, swelling in your face or hands, swelling or tingling in your mouth or throat, chest tightness, trouble breathing  · Bloody or black, tarry stools  · Confusion, weakness, uneven heartbeat, trouble breathing, numbness in your hands, feet, or lips  · Severe stomach pain or vomiting  · Throat pain, feeling as if pill is stuck in the throat  If you notice these less serious side effects, talk with your doctor:   · Mild nausea, diarrhea, gas  If you notice other side effects that you think are caused by this medicine, tell your doctor. Call your doctor for medical advice about side effects. You may report side effects to FDA at 4-751-FDA-7828  © 2017 [facility] Information is for End User's use only and may not be sold, redistributed or otherwise used for commercial purposes.   The above information is an  only. It is not intended as medical advice for individual conditions or treatments. Talk to your doctor, nurse or pharmacist before following any medical regimen to see if it is safe and effective for you.

## 2021-07-23 NOTE — DISCHARGE SUMMARY
Hospitalist Discharge Summary     Admit Date:  2021  7:50 AM   DC note date: 2021  Name:  Wili Rodrigez   Age:  66 y.o.  :  1950   MRN:  [id number]   PCP:  Thuy Herman MD  Treatment Team: Attending Provider: Sandy Stanley MD; Consulting Provider: Hina Vazquez MD; Care Manager: Krysten Mcdonald RN; Utilization Review: Valentina Webster RN    Problem List for this Hospitalization:  Hospital Problems as of 2021 Never Reviewed        Codes Class Noted - Resolved POA    Hyperlipidemia ICD-10-CM: E78.5  ICD-9-CM: 272.4  2021 - Present Unknown        HTN (hypertension) ICD-10-CM: I10  ICD-9-CM: 401.9  2021 - Present Unknown        DM type 2 (diabetes mellitus, type 2) ([facility] ICD-10-CM: E11.9  ICD-9-CM: 250.00  2021 - Present Unknown        Atrial fibrillation ([facility] ICD-10-CM: I48.91  ICD-9-CM: 427.31  2021 - Present         GERD (gastroesophageal reflux disease) ICD-10-CM: K21.9  ICD-9-CM: 530.81  2021 - Present Unknown        Cystitis ICD-10-CM: N30.90  ICD-9-CM: 595.9  2021 - Present         RESOLVED: Nausea & vomiting ICD-10-CM: R11.2  ICD-9-CM: 787.01  2021 - 2021 Unknown              Hospital Course:    Please see HPI and daily progress note for more details. I have seen this patient for the first time today. Briefly, 77-year-old female with history of type 2 diabetes, hypertension and hyperlipidemia presented to [place] emergency room with nausea and vomiting. She was diagnosed with atrial fibrillation with rapid ventricular rate. She was started on Cardizem drip and transferred to [facility]. Patient was evaluated by cardiology. Echo, TSH was negative for any acute event. Patient is transition to Cardizem p.o.  Eliquis started based on her risk factor.   Patient does not have any stent in her body or does not have any history of MI so we will stop aspirin    Also there is concern that patient has cystitis and she received 1 dose of ceftriaxone. Her cultures are still pending. Patient feels back to normal and wants to go home. She is being discharged on medication adjustment of her hypertension. I have given her prescription of 2 more days of Keflex to complete 3 days course of cystitis. Cultures negative so far but it is too early to get final culture. Red flag signs discussed with the patient. Medication changes discussed with patient .  has helped patient to afford her medications. Patient is instructed to follow-up with cardiologist and primary care on discharge. Her potassium is repleted and she will be given prescription of 3 days of potassium. Disposition: Home or Self Care  Activity: Activity as tolerated  Diet: ADULT DIET Regular; 4 carb choices (60 gm/meal); Low Fat/Low Chol/High Fiber/2 gm Na; Low Sodium (2 gm)  Code Status: Full Code    Follow Up Orders: Follow-up Appointments   Procedures    FOLLOW UP VISIT Appointment in: Other (Specify) PCP as scheduled. Cardiologist in one week. PCP as scheduled. Cardiologist in one week. Standing Status:   Standing     Number of Occurrences:   1     Order Specific Question:   Appointment in     Answer: Other (Specify)       Follow-up Information     Follow up With Specialties Details Why Contact Info    Jenni Hoover MD Internal Medicine   [address]  [phone]      Jenni Hoover MD Internal Medicine  already have appointment on Tuesday (next week) [address]  [phone]      Aubrey Connelly MD Cardiology Schedule an appointment as soon as possible for a visit in 1 week  [address] [place] [address]  [phone]            Discharge meds at bottom of this note. Plan was discussed with patient and her . All questions answered. Patient was stable at time of discharge.   Given instructions to call a physician or return if any concerns. Discharge summary and encounter summary was sent to PCP electronically via \"Comm Mgt\" link in [facility], if possible. Diagnostic Imaging/Tests:   ECHO ADULT COMPLETE    Result Date: 7/22/2021  · LV: Estimated LVEF is 55 - 60%. Normal cavity size, systolic function (ejection fraction normal) and diastolic function. Wall motion: normal. · Contrast used: DEFINITY. Echocardiogram results:  No results found for this visit on 07/22/21.     Procedures done this admission:  * No surgery found *    All Micro Results     Procedure Component Value Units Date/Time    CULTURE, URINE [[id number]] Collected: 07/22/21 1250    Order Status: Completed Specimen: Urine from Clean catch Updated: 07/23/21 0809     Special Requests: NO SPECIAL REQUESTS        Culture result: NO GROWTH 1 DAY             SARS-CoV-2 Lab Results  \"Novel Coronavirus\" Test: No results found for: COV2NT   \"Emergent Disease\" Test: No results found for: EDPR  \"SARS-COV-2\" Test: No results found for: XGCOVT  \"Precision Labs\" Test: No results found for: RSLT  Rapid Test: No results found for: COVR         Labs: Results:       BMP, Mg, Phos Recent Labs     07/23/21  0450 07/22/21  0012    140   K 3.4* 3.7   * 107   CO2 26 25   AGAP 6* 8   BUN 13 15   CREA 0.63 0.77   CA 8.9 9.3   * 165*   MG 2.1 1.9      CBC Recent Labs     07/22/21  0012   WBC 11.3*   RBC 4.62   HGB 14.3   HCT 41.5      GRANS 69   LYMPH 23   EOS 0*   MONOS 6   BASOS 0   IG 0   ANEU 7.8   ABL 2.7   PAWAN 0.1   ABM 0.7   ABB 0.1   AIG 0.0      LFT Recent Labs     07/22/21  0012   ALT 26   AP 95   TP 7.7   ALB 3.7   GLOB 4.0*   AGRAT 0.9*      Cardiac Testing No results found for: BNPP, BNP, CPK, RCK1, RCK2, RCK3, RCK4, CKMB, CKNDX, CKND1, TROPT, TROIQ   Coagulation Tests No results found for: PTP, INR, APTT, INREXT, INREXT   A1c Lab Results   Component Value Date/Time    Hemoglobin A1c 6.8 (H) 07/22/2021 09:00 AM      Lipid Panel Lab Results   Component Value Date/Time    Cholesterol, total 180 07/23/2021 04:50 AM    HDL Cholesterol 41 07/23/2021 04:50 AM    LDL, calculated 105.2 (H) 07/23/2021 04:50 AM    VLDL, calculated 33.8 (H) 07/23/2021 04:50 AM    Triglyceride 169 (H) 07/23/2021 04:50 AM    CHOL/HDL Ratio 4.4 07/23/2021 04:50 AM      Thyroid Panel Lab Results   Component Value Date/Time    TSH 1.930 07/22/2021 09:00 AM        Most Recent UA Lab Results   Component Value Date/Time    WBC 20-50 07/22/2021 12:30 AM    RBC 0-3 07/22/2021 12:30 AM    Epithelial cells 0-3 07/22/2021 12:30 AM    Bacteria 0 07/22/2021 12:30 AM    Casts 0 07/22/2021 12:30 AM        Allergies   Allergen Reactions    Codeine Rash    Invokana [Canagliflozin] Rash    Januvia [Sitagliptin] Rash    Sulfa (Sulfonamide Antibiotics) Rash       There is no immunization history on file for this patient.     All Labs from Last 24 Hrs:  Recent Results (from the past 24 hour(s))   GLUCOSE, POC    Collection Time: 07/22/21 10:47 AM   Result Value Ref Range    Glucose (POC) 226 (H) 65 - 100 mg/dL    Performed by Jarred    ECHO ADULT COMPLETE    Collection Time: 07/22/21 11:50 AM   Result Value Ref Range    Left Atrium Major Axis 5.47 cm    LA Area 2C 17.90 cm2    Aortic Valve Area by Continuity of Peak Velocity 1.82 cm2    LV E' Septal Velocity 7.35 cm/s    AO ASC D 3.60 cm    Ao Root D 2.90 cm    LVOT d 1.80 cm    LVOT Peak Velocity 99.80 cm/s    LVOT Peak Gradient 4.0 mmHg    LVOT VTI 24.80 cm    Aortic Valve Systolic Peak Velocity 646.60 cm/s    AoV PG 7.7 mmHg    Aortic Valve Systolic Mean Gradient 4.0 mmHg    AoV VTI 32.20 cm    Aortic Valve Area by Continuity of VTI 2.0 cm2    Left Atrium Minor Axis 3.16 cm    LA Volume 51.5 22.0 - 52.0 mL    LA Area 4C 17.9 cm2    LA Balta-Posterior Dimension 3.40 cm    Left Atrium to Aortic Root Ratio 1.17     LV E' Septal Velocity 7.35 cm/s    LV E' Lateral Velocity 6.96 cm/s    MV E Alfredo 93.00 cm/s    MV A Alfredo 136.00 cm/s    LVIDs 2.40 cm    LVIDd 3.80 (A) 3.90 - 5.30 cm    IVSd 1.00 (A) 0.60 - 0.90 cm    LVPWd 1.00 (A) 0.60 - 0.90 cm    LV Mass .3 67.0 - 162.0 g    LV Mass AL Index 67.8 43.0 - 95.0 g/m2    MV Mean Gradient 3.5 mmHg    MV Peak Gradient 3.5 mmHg    Mitral Valve Pressure Half-time 74.0 ms    MVA (PHT) 3.0 cm2    Tapse 1.76 1.50 - 2.00 cm    RVIDd 3.30 cm    MV E/A 0.68     E/E' lateral 13.36     LA Vol Index 29.77 ml/m2    HEATH/BSA Pk Alfredo 1.1 cm2/m2    HEATH/BSA VTI 1.2 cm2/m2   CULTURE, URINE    Collection Time: 07/22/21 12:50 PM    Specimen: Clean catch; Urine    URINE   Result Value Ref Range    Special Requests: NO SPECIAL REQUESTS      Culture result: NO GROWTH 1 DAY     GLUCOSE, POC    Collection Time: 07/22/21  4:01 PM   Result Value Ref Range    Glucose (POC) 109 (H) 65 - 100 mg/dL    [facility] by [facility]    GLUCOSE, POC    Collection Time: 07/22/21  9:08 PM   Result Value Ref Range    Glucose (POC) 155 (H) 65 - 100 mg/dL    Performed by [facility]    LIPID PANEL    Collection Time: 07/23/21  4:50 AM   Result Value Ref Range    Cholesterol, total 180 <200 MG/DL    Triglyceride 169 (H) 35 - 150 MG/DL    HDL Cholesterol 41 40 - 60 MG/DL    LDL, calculated 105.2 (H) <100 MG/DL    VLDL, calculated 33.8 (H) 6.0 - 23.0 MG/DL    CHOL/HDL Ratio 4.4     METABOLIC PANEL, BASIC    Collection Time: 07/23/21  4:50 AM   Result Value Ref Range    Sodium 143 136 - 145 mmol/L    Potassium 3.4 (L) 3.5 - 5.1 mmol/L    Chloride 111 (H) 98 - 107 mmol/L    CO2 26 21 - 32 mmol/L    Anion gap 6 (L) 7 - 16 mmol/L    Glucose 118 (H) 65 - 100 mg/dL    BUN 13 8 - 23 MG/DL    Creatinine 0.63 0.6 - 1.0 MG/DL    GFR est AA >60 >60 ml/min/1.73m2    GFR est non-AA >60 >60 ml/min/1.73m2    Calcium 8.9 8.3 - 10.4 MG/DL   MAGNESIUM    Collection Time: 07/23/21  4:50 AM   Result Value Ref Range    Magnesium 2.1 1.8 - 2.4 mg/dL   GLUCOSE, POC    Collection Time: 07/23/21  7:38 AM   Result Value Ref Range    Glucose (POC) 117 (H) 65 - 100 mg/dL    Performed by RottaHarpalMALENA        Discharge Exam:  Patient Vitals for the past 24 hrs:   Temp Pulse Resp BP SpO2   07/23/21 0740 97.1 °F (36.2 °C) 65 17 133/71 98 %   07/23/21 0429 97.4 °F (36.3 °C) 73 16 (!) 146/69 97 %   07/23/21 0014 97.8 °F (36.6 °C) 75  (!) 149/72 98 %   07/22/21 2022 98 °F (36.7 °C) 71 18 (!) 163/78 100 %   07/22/21 1600 97.5 °F (36.4 °C) 71 18 131/82 100 %   07/22/21 1337    (!) 156/79    07/22/21 1222 97.9 °F (36.6 °C) 74 16 139/65 100 %     Oxygen Therapy  O2 Sat (%): 98 % (07/23/21 0740)  O2 Device: None (Room air) (07/23/21 0734)    Estimated body mass index is 26.5 kg/m² as calculated from the following:    Height as of this encounter: 5' 4\" (1.626 m). Weight as of this encounter: 70 kg (154 lb 6.4 oz). Intake/Output Summary (Last 24 hours) at 7/23/2021 1025  Last data filed at 7/23/2021 0940  Gross per 24 hour   Intake 330 ml   Output 200 ml   Net 130 ml       *Note that automatically entered I/Os may not be accurate; dependent on patient compliance with collection and accurate  by assistants. General:    Well nourished. AO x3  Eyes:      Extraocular movements intact. ENT:  Normocephalic, atraumatic. Moist mucous membranes  CV:   Regular rate and rhythm. No murmur, rub, or gallop. Lungs:  Clear to auscultation bilaterally. No wheezing, rhonchi, or rales. Abdomen: Soft, nontender, nondistended. Extremities: Warm and dry. No cyanosis or edema. Neurologic: C AOx3, moving all 4 extremities, speech normal.  Skin:     No rashes or jaundice. Psych:  Normal mood and affect.     Current Med List in Hospital:   Current Facility-Administered Medications   Medication Dose Route Frequency    potassium chloride (K-DUR, KLOR-CON) SR tablet 40 mEq  40 mEq Oral NOW    dextrose 40% (GLUTOSE) oral gel 1 Tube  15 g Oral PRN    glucagon (GLUCAGEN) injection 1 mg  1 mg IntraMUSCular PRN    dextrose (D50W) injection syrg 12.5-25 g 25-50 mL IntraVENous PRN    insulin lispro (HUMALOG) injection   SubCUTAneous AC&HS    ondansetron (ZOFRAN) injection 4 mg  4 mg IntraVENous Q6H PRN    sodium chloride (NS) flush 5-40 mL  5-40 mL IntraVENous Q8H    sodium chloride (NS) flush 5-40 mL  5-40 mL IntraVENous PRN    acetaminophen (TYLENOL) tablet 650 mg  650 mg Oral Q6H PRN    Or    acetaminophen (TYLENOL) suppository 650 mg  650 mg Rectal Q6H PRN    polyethylene glycol (MIRALAX) packet 17 g  17 g Oral DAILY PRN    ondansetron (ZOFRAN ODT) tablet 4 mg  4 mg Oral Q8H PRN    Or    ondansetron (ZOFRAN) injection 4 mg  4 mg IntraVENous Q6H PRN    dilTIAZem ER (CARDIZEM CD) capsule 120 mg  120 mg Oral DAILY    losartan (COZAAR) tablet 25 mg  25 mg Oral DAILY    cefTRIAXone (ROCEPHIN) 1 g in 0.9% sodium chloride (MBP/ADV) 50 mL MBP  1 g IntraVENous Q24H    apixaban (ELIQUIS) tablet 5 mg  5 mg Oral BID       Discharge Info:   Discharge Medication List as of 7/23/2021 10:45 AM      START taking these medications    Details   apixaban (ELIQUIS) 5 mg tablet Take 1 Tablet by mouth two (2) times a day., Normal, Disp-60 Tablet, R-0      dilTIAZem ER (CARDIZEM CD) 120 mg capsule Take 1 Capsule by mouth daily. , Normal, Disp-30 Capsule, R-0      losartan (COZAAR) 25 mg tablet Take 1 Tablet by mouth daily. , Normal, Disp-30 Tablet, R-0      potassium chloride (K-DUR, KLOR-CON) 20 mEq tablet Take 1 Tablet by mouth daily. , Normal, Disp-3 Tablet, R-0      cephALEXin (KEFLEX) 500 mg capsule Take 1 Capsule by mouth three (3) times daily. , Normal, Disp-6 Capsule, R-0         CONTINUE these medications which have NOT CHANGED    Details   dulaglutide (TRULICITY) 1.24 HW/5.4 mL sub-q pen 0.75 mg by SubCUTAneous route every seven (7) days. , Historical Med      metFORMIN (GLUMETZA ER) 500 mg TG24 24 hour tablet Take 500 mg by mouth., Historical Med      glimepiride (AMARYL) 4 mg tablet Take 4 mg by mouth every morning., Historical Med      atorvastatin (LIPITOR) 40 mg tablet Take 40 mg by mouth daily. , Historical Med         STOP taking these medications       rivaroxaban (Xarelto) 20 mg tab tablet Comments:   Reason for Stopping:         losartan-hydroCHLOROthiazide (HYZAAR) 100-12.5 mg per tablet Comments:   Reason for Stopping:         atenoloL (TENORMIN) 25 mg tablet Comments:   Reason for Stopping:         aspirin 81 mg chewable tablet Comments:   Reason for Stopping:               Patient changes discussed with the patient. Initially, Xarelto was prescribed to check the cost.  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 All the medication changes discussed with them. Time spent in patient discharge planning and coordination 25 minutes.     Signed:  Aby Simon MD

## 2021-07-23 NOTE — ROUTINE PROCESS
Discharge instructions reviewed with Ml Bautista. Prescriptions sent to pharmacy. Opportunity for questions provided. Patient voiced understanding of all discharge instructions.      IV and heart monitor removed

## 2021-07-23 NOTE — ROUTINE PROCESS
Verbal bedside report given to Kristen Starr oncoming RN. Patient's situation, background, assessment and recommendations provided. Opportunity for questions provided. Oncoming RN assumed care of patient.

## 2021-07-24 LAB
BACTERIA SPEC CULT: NORMAL
SERVICE CMNT-IMP: NORMAL

## 2021-07-27 LAB
BACTERIA SPEC CULT: NORMAL
BACTERIA SPEC CULT: NORMAL
SERVICE CMNT-IMP: NORMAL
SERVICE CMNT-IMP: NORMAL

## 2021-08-03 ENCOUNTER — HOSPITAL ENCOUNTER (OUTPATIENT)
Dept: LAB | Age: 71
Discharge: HOME | End: 2021-08-03
Payer: MEDICARE

## 2021-08-03 DIAGNOSIS — E87.6 HYPOKALEMIA: ICD-10-CM

## 2021-08-03 LAB
ANION GAP SERPL CALC-SCNC: 7 MMOL/L (ref 7–16)
BUN SERPL-MCNC: 14 MG/DL (ref 8–23)
CALCIUM SERPL-MCNC: 9.2 MG/DL (ref 8.3–10.4)
CHLORIDE SERPL-SCNC: 105 MMOL/L (ref 98–107)
CO2 SERPL-SCNC: 28 MMOL/L (ref 21–32)
CREAT SERPL-MCNC: 0.82 MG/DL (ref 0.6–1)
GLUCOSE SERPL-MCNC: 125 MG/DL (ref 65–100)
POTASSIUM SERPL-SCNC: 4 MMOL/L (ref 3.5–5.1)
SODIUM SERPL-SCNC: 140 MMOL/L (ref 136–145)

## 2021-08-03 PROCEDURE — 36415 COLL VENOUS BLD VENIPUNCTURE: CPT

## 2021-08-03 PROCEDURE — 80048 BASIC METABOLIC PNL TOTAL CA: CPT

## 2021-11-29 ENCOUNTER — HOSPITAL ENCOUNTER (EMERGENCY)
Age: 71
Discharge: HOME | End: 2021-11-29
Attending: EMERGENCY MEDICINE
Payer: MEDICARE

## 2021-11-29 ENCOUNTER — APPOINTMENT (OUTPATIENT)
Dept: CT IMAGING | Age: 71
End: 2021-11-29
Attending: EMERGENCY MEDICINE
Payer: MEDICARE

## 2021-11-29 VITALS
TEMPERATURE: 98.1 F | HEART RATE: 72 BPM | WEIGHT: 151 LBS | DIASTOLIC BLOOD PRESSURE: 81 MMHG | OXYGEN SATURATION: 97 % | RESPIRATION RATE: 17 BRPM | HEIGHT: 64 IN | BODY MASS INDEX: 25.78 KG/M2 | SYSTOLIC BLOOD PRESSURE: 181 MMHG
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
## 2021-11-29 DIAGNOSIS — S00.83XA CONTUSION OF FOREHEAD, INITIAL ENCOUNTER: ICD-10-CM

## 2021-11-29 PROCEDURE — 70450 CT HEAD/BRAIN W/O DYE: CPT

## 2021-11-29 PROCEDURE — 99283 EMERGENCY DEPT VISIT LOW MDM: CPT

## 2021-11-29 PROCEDURE — 72125 CT NECK SPINE W/O DYE: CPT

## 2021-11-29 RX ORDER — GUAIFENESIN 100 MG/5ML
81 LIQUID (ML) ORAL DAILY
COMMUNITY

## 2021-11-30 NOTE — DISCHARGE INSTRUCTIONS
Return with any fevers, vomiting, confusion, worsening symptoms, or additional concerns. Follow-up with your primary care doctor for reevaluation.

## 2021-11-30 NOTE — ED PROVIDER NOTES
77-year-old lady presents with concerns about swelling on her left forehead after falling down some stairs in her garage. She said that she is on Pradaxa for history of A. fib. She said she had no loss of consciousness but also scraped her shins and had a little bit of a difficult time getting back up. She said some neighbors were walking by and helped her stand back up and get her  who then brought her to the ER. She says that she has no neck pain. She denies any back pain. She has had no weakness or numbness. No other associated symptoms. She says they travel to [facility] a couple years ago and she notes her immunizations are up-to-date. Elements of this note were created using speech recognition software. As such, errors of speech recognition may be present. Past Medical History:   Diagnosis Date    [facility]     Diabetes ([facility]     Hypercholesterolemia     Hypertension        History reviewed. No pertinent surgical history. History reviewed. No pertinent family history. Social History     Socioeconomic History    Marital status:      Spouse name: Not on file    Number of children: Not on file    Years of education: Not on file    Highest education level: Not on file   Occupational History    Not on file   Tobacco Use    Smoking status: Never Smoker    Smokeless tobacco: Never Used   Substance and Sexual Activity    Alcohol use: Not on file    Drug use: Not on file    Sexual activity: Not on file   Other Topics Concern    Not on file   Social History Narrative    Not on file     Social Determinants of Health     Financial Resource Strain:     Difficulty of Paying Living Expenses: Not on file   Food Insecurity:     Worried About Running Out of Food in the Last Year: Not on file    Laurie of Food in the Last Year: Not on file   Transportation Needs:     Lack of Transportation (Medical): Not on file    Lack of Transportation (Non-Medical):  Not on file   Physical Activity:     Days of Exercise per Week: Not on file    Minutes of Exercise per Session: Not on file   Stress:     Feeling of Stress : Not on file   Social Connections:     Frequency of Communication with Friends and Family: Not on file    Frequency of Social Gatherings with Friends and Family: Not on file    Attends Tenriism Services: Not on file    Active Member of [address] [place] [address] or Organizations: Not on file    Attends Club or Organization Meetings: Not on file    Marital Status: Not on file   Intimate Partner Violence:     Fear of Current or Ex-Partner: Not on file    Emotionally Abused: Not on file    Physically Abused: Not on file    Sexually Abused: Not on file   Housing Stability:     Unable to Pay for Housing in the Last Year: Not on file    Number of [place] in the Last Year: Not on file    Unstable Housing in the Last Year: Not on file         ALLERGIES: Codeine, Invokana [canagliflozin], Januvia [sitagliptin], and Sulfa (sulfonamide antibiotics)    Review of Systems   Constitutional: Negative for chills and fever. Respiratory: Negative for cough, chest tightness and shortness of breath. Cardiovascular: Negative for chest pain and palpitations. Gastrointestinal: Negative for diarrhea, nausea and vomiting. Musculoskeletal: Negative for arthralgias, gait problem, joint swelling, myalgias and neck pain. Skin: Positive for wound. Negative for color change. Neurological: Positive for headaches. Negative for dizziness, seizures, weakness and light-headedness. Vitals:    11/29/21 1955   BP: (!) 181/81   Pulse: 72   Resp: 17   Temp: 98.1 °F (36.7 °C)   SpO2: 97%   Weight: 68.5 kg (151 lb)   Height: 5' 4\" (1.626 m)            Physical Exam  Vitals and nursing note reviewed. Constitutional:       Appearance: Normal appearance.    HENT:      Head:      Comments: Contusion over her left forehead with a hematoma     Nose: Nose normal.      Mouth/Throat:      Mouth: Mucous membranes are moist.   Eyes:      General:         Right eye: No discharge. Left eye: No discharge. Cardiovascular:      Rate and Rhythm: Normal rate. Pulses: Normal pulses. Pulmonary:      Effort: Pulmonary effort is normal.   Musculoskeletal:         General: No tenderness or signs of injury. Neurological:      General: No focal deficit present. Mental Status: She is alert and oriented to person, place, and time. [facility]    ED Course as of 11/29/21 2110 Mon Nov 29, 2021 2106 Patient CT scans are unremarkable.  I will discharge her home. [AC]      ED Course User Index  [AC] Tess Chen MD       Procedures

## 2021-11-30 NOTE — ED TRIAGE NOTES
Pt c/o mechanical fall down 2-3 steps. Pt states she hit her head with hematoma to the left side of her forehead and abrasions to both shins. Pt unsure of LOC. Pt reports she does take a blood thinner and ASA. Pt ambulatory to triage. Pt masked.

## 2021-11-30 NOTE — ED NOTES
I have reviewed discharge instructions with the patient and spouse. The patient and spouse verbalized understanding. Patient left ED via Discharge Method: ambulatory to Home with (spouse). Opportunity for questions and clarification provided. Patient given 0 scripts. To continue your aftercare when you leave the hospital, you may receive an automated call from our care team to check in on how you are doing. This is a free service and part of our promise to provide the best care and service to meet your aftercare needs.  If you have questions, or wish to unsubscribe from this service please call [phone]. Thank you for Choosing our [employer] [facility] Emergency Department.

## 2022-03-18 PROBLEM — N30.90 CYSTITIS: Status: ACTIVE | Noted: 2021-07-22

## 2022-03-18 PROBLEM — E78.5 HYPERLIPIDEMIA: Status: ACTIVE | Noted: 2021-07-22

## 2022-03-19 PROBLEM — I48.91 ATRIAL FIBRILLATION (HCC): Status: ACTIVE | Noted: 2021-07-22

## 2022-03-19 PROBLEM — K21.9 GERD (GASTROESOPHAGEAL REFLUX DISEASE): Status: ACTIVE | Noted: 2021-07-22

## 2022-03-19 PROBLEM — E87.6 HYPOKALEMIA: Status: ACTIVE | Noted: 2021-08-03

## 2022-03-20 PROBLEM — E11.9 DM TYPE 2 (DIABETES MELLITUS, TYPE 2) (HCC): Status: ACTIVE | Noted: 2021-07-22

## 2022-03-20 PROBLEM — I10 HTN (HYPERTENSION): Status: ACTIVE | Noted: 2021-07-22

## 2022-08-05 RX ORDER — DILTIAZEM HYDROCHLORIDE 120 MG/1
CAPSULE, COATED, EXTENDED RELEASE ORAL
Qty: 90 CAPSULE | Refills: 3 | OUTPATIENT
Start: 2022-08-05

## 2022-08-05 RX ORDER — LOSARTAN POTASSIUM 25 MG/1
TABLET ORAL
Qty: 90 TABLET | Refills: 3 | OUTPATIENT
Start: 2022-08-05

## 2023-05-25 ENCOUNTER — APPOINTMENT (RX ONLY)
Dept: URBAN - METROPOLITAN AREA CLINIC 329 | Facility: CLINIC | Age: 73
Setting detail: DERMATOLOGY
End: 2023-05-25

## 2023-05-25 DIAGNOSIS — L81.4 OTHER MELANIN HYPERPIGMENTATION: ICD-10-CM

## 2023-05-25 DIAGNOSIS — D22 MELANOCYTIC NEVI: ICD-10-CM

## 2023-05-25 DIAGNOSIS — D18.0 HEMANGIOMA: ICD-10-CM

## 2023-05-25 DIAGNOSIS — L82.1 OTHER SEBORRHEIC KERATOSIS: ICD-10-CM

## 2023-05-25 PROBLEM — D18.01 HEMANGIOMA OF SKIN AND SUBCUTANEOUS TISSUE: Status: ACTIVE | Noted: 2023-05-25

## 2023-05-25 PROBLEM — D22.71 MELANOCYTIC NEVI OF RIGHT LOWER LIMB, INCLUDING HIP: Status: ACTIVE | Noted: 2023-05-25

## 2023-05-25 PROBLEM — D22.62 MELANOCYTIC NEVI OF LEFT UPPER LIMB, INCLUDING SHOULDER: Status: ACTIVE | Noted: 2023-05-25

## 2023-05-25 PROBLEM — D22.61 MELANOCYTIC NEVI OF RIGHT UPPER LIMB, INCLUDING SHOULDER: Status: ACTIVE | Noted: 2023-05-25

## 2023-05-25 PROCEDURE — ? SUNSCREEN RECOMMENDATIONS

## 2023-05-25 PROCEDURE — ? COUNSELING

## 2023-05-25 PROCEDURE — ? FULL BODY SKIN EXAM

## 2023-05-25 PROCEDURE — 99213 OFFICE O/P EST LOW 20 MIN: CPT

## 2023-05-25 PROCEDURE — ? ADDITIONAL NOTES

## 2023-05-25 ASSESSMENT — LOCATION DETAILED DESCRIPTION DERM
LOCATION DETAILED: RIGHT DISTAL POSTERIOR THIGH
LOCATION DETAILED: RIGHT INFERIOR LATERAL NECK
LOCATION DETAILED: RIGHT MEDIAL UPPER BACK
LOCATION DETAILED: LEFT VENTRAL PROXIMAL FOREARM
LOCATION DETAILED: LEFT DISTAL POSTERIOR UPPER ARM
LOCATION DETAILED: LEFT INFERIOR LATERAL MIDBACK
LOCATION DETAILED: PERIUMBILICAL SKIN
LOCATION DETAILED: RIGHT DISTAL POSTERIOR UPPER ARM
LOCATION DETAILED: RIGHT ANTERIOR DISTAL UPPER ARM
LOCATION DETAILED: RIGHT ANTERIOR DISTAL THIGH
LOCATION DETAILED: RIGHT PROXIMAL PRETIBIAL REGION
LOCATION DETAILED: LEFT SUPERIOR UPPER BACK

## 2023-05-25 ASSESSMENT — LOCATION ZONE DERM
LOCATION ZONE: TRUNK
LOCATION ZONE: NECK
LOCATION ZONE: LEG
LOCATION ZONE: ARM

## 2023-05-25 ASSESSMENT — LOCATION SIMPLE DESCRIPTION DERM
LOCATION SIMPLE: RIGHT POSTERIOR UPPER ARM
LOCATION SIMPLE: LEFT POSTERIOR UPPER ARM
LOCATION SIMPLE: LEFT FOREARM
LOCATION SIMPLE: RIGHT ANTERIOR NECK
LOCATION SIMPLE: LEFT UPPER BACK
LOCATION SIMPLE: RIGHT UPPER BACK
LOCATION SIMPLE: RIGHT UPPER ARM
LOCATION SIMPLE: RIGHT POSTERIOR THIGH
LOCATION SIMPLE: RIGHT THIGH
LOCATION SIMPLE: ABDOMEN
LOCATION SIMPLE: RIGHT PRETIBIAL REGION
LOCATION SIMPLE: LEFT LOWER BACK

## 2025-03-24 ENCOUNTER — APPOINTMENT (OUTPATIENT)
Dept: URBAN - METROPOLITAN AREA CLINIC 329 | Facility: CLINIC | Age: 75
Setting detail: DERMATOLOGY
End: 2025-03-24

## 2025-03-24 DIAGNOSIS — D18.0 HEMANGIOMA: ICD-10-CM

## 2025-03-24 DIAGNOSIS — L82.1 OTHER SEBORRHEIC KERATOSIS: ICD-10-CM

## 2025-03-24 DIAGNOSIS — L81.4 OTHER MELANIN HYPERPIGMENTATION: ICD-10-CM

## 2025-03-24 DIAGNOSIS — L57.8 OTHER SKIN CHANGES DUE TO CHRONIC EXPOSURE TO NONIONIZING RADIATION: ICD-10-CM

## 2025-03-24 DIAGNOSIS — D22 MELANOCYTIC NEVI: ICD-10-CM

## 2025-03-24 PROBLEM — D18.01 HEMANGIOMA OF SKIN AND SUBCUTANEOUS TISSUE: Status: ACTIVE | Noted: 2025-03-24

## 2025-03-24 PROBLEM — D22.5 MELANOCYTIC NEVI OF TRUNK: Status: ACTIVE | Noted: 2025-03-24

## 2025-03-24 PROBLEM — D22.61 MELANOCYTIC NEVI OF RIGHT UPPER LIMB, INCLUDING SHOULDER: Status: ACTIVE | Noted: 2025-03-24

## 2025-03-24 PROBLEM — D22.71 MELANOCYTIC NEVI OF RIGHT LOWER LIMB, INCLUDING HIP: Status: ACTIVE | Noted: 2025-03-24

## 2025-03-24 PROBLEM — D22.72 MELANOCYTIC NEVI OF LEFT LOWER LIMB, INCLUDING HIP: Status: ACTIVE | Noted: 2025-03-24

## 2025-03-24 PROCEDURE — 99213 OFFICE O/P EST LOW 20 MIN: CPT

## 2025-03-24 PROCEDURE — ? COUNSELING

## 2025-03-24 PROCEDURE — ? TREATMENT REGIMEN

## 2025-03-24 ASSESSMENT — LOCATION SIMPLE DESCRIPTION DERM
LOCATION SIMPLE: ANTERIOR SCALP
LOCATION SIMPLE: LEFT UPPER BACK
LOCATION SIMPLE: LEFT CALF
LOCATION SIMPLE: RIGHT THIGH
LOCATION SIMPLE: RIGHT CLAVICULAR SKIN
LOCATION SIMPLE: RIGHT FOREARM
LOCATION SIMPLE: UPPER BACK
LOCATION SIMPLE: RIGHT UPPER ARM
LOCATION SIMPLE: RIGHT UPPER BACK
LOCATION SIMPLE: ABDOMEN
LOCATION SIMPLE: LEFT UPPER ARM
LOCATION SIMPLE: LEFT CHEEK
LOCATION SIMPLE: CHEST
LOCATION SIMPLE: LEFT POSTERIOR THIGH

## 2025-03-24 ASSESSMENT — LOCATION DETAILED DESCRIPTION DERM
LOCATION DETAILED: MID-FRONTAL SCALP
LOCATION DETAILED: LEFT INFERIOR UPPER BACK
LOCATION DETAILED: LEFT PROXIMAL POSTERIOR THIGH
LOCATION DETAILED: LEFT CENTRAL MALAR CHEEK
LOCATION DETAILED: RIGHT SUPERIOR MEDIAL UPPER BACK
LOCATION DETAILED: UPPER STERNUM
LOCATION DETAILED: LEFT DISTAL POSTERIOR THIGH
LOCATION DETAILED: EPIGASTRIC SKIN
LOCATION DETAILED: LEFT DISTAL CALF
LOCATION DETAILED: RIGHT ANTERIOR DISTAL THIGH
LOCATION DETAILED: RIGHT PROXIMAL DORSAL FOREARM
LOCATION DETAILED: RIGHT CLAVICULAR SKIN
LOCATION DETAILED: RIGHT ANTERIOR DISTAL UPPER ARM
LOCATION DETAILED: RIGHT VENTRAL DISTAL FOREARM
LOCATION DETAILED: LEFT ANTERIOR DISTAL UPPER ARM
LOCATION DETAILED: SUPERIOR THORACIC SPINE

## 2025-03-24 ASSESSMENT — LOCATION ZONE DERM
LOCATION ZONE: FACE
LOCATION ZONE: SCALP
LOCATION ZONE: ARM
LOCATION ZONE: LEG
LOCATION ZONE: TRUNK